# Patient Record
Sex: MALE
[De-identification: names, ages, dates, MRNs, and addresses within clinical notes are randomized per-mention and may not be internally consistent; named-entity substitution may affect disease eponyms.]

---

## 2024-02-27 PROBLEM — Z00.00 ENCOUNTER FOR PREVENTIVE HEALTH EXAMINATION: Status: ACTIVE | Noted: 2024-02-27

## 2024-03-15 ENCOUNTER — APPOINTMENT (OUTPATIENT)
Dept: SURGERY | Facility: CLINIC | Age: 54
End: 2024-03-15
Payer: COMMERCIAL

## 2024-03-15 VITALS
OXYGEN SATURATION: 99 % | DIASTOLIC BLOOD PRESSURE: 80 MMHG | HEART RATE: 80 BPM | TEMPERATURE: 97.6 F | HEIGHT: 68 IN | RESPIRATION RATE: 17 BRPM | WEIGHT: 175 LBS | BODY MASS INDEX: 26.52 KG/M2 | SYSTOLIC BLOOD PRESSURE: 119 MMHG

## 2024-03-15 DIAGNOSIS — Z83.3 FAMILY HISTORY OF DIABETES MELLITUS: ICD-10-CM

## 2024-03-15 DIAGNOSIS — N20.0 CALCULUS OF KIDNEY: ICD-10-CM

## 2024-03-15 PROCEDURE — 99203 OFFICE O/P NEW LOW 30 MIN: CPT

## 2024-03-15 NOTE — PHYSICAL EXAM
[Normal Heart Sounds] : normal heart sounds [Normal Breath Sounds] : Normal breath sounds [Alert] : alert [Oriented to Person] : oriented to person [Oriented to Place] : oriented to place [Calm] : calm [Oriented to Time] : oriented to time [Abdominal Masses] : No abdominal masses [Abdomen Tenderness] : ~T ~M No abdominal tenderness [No HSM] : no hepatosplenomegaly [de-identified] : WNL [de-identified] : WNL [de-identified] : DAVIDL [de-identified] : WNL [de-identified] : WNL ROM

## 2024-03-15 NOTE — ASSESSMENT
[FreeTextEntry1] : In summary the patient has had intermittent right lower quadrant pain for several months.  A CT abdomen pelvis demonstrates dilation and inflammation of the appendix suggestive of either chronic appendicitis versus appendiceal neoplasm.  I ordered a CEA level I recommended a colonoscopy.  Ultimately he will either require laparoscopic appendectomy versus a laparoscopic right colectomy if there is suggestion of an appendiceal neoplasm.  Final plan will be made following his colonoscopy.

## 2024-03-15 NOTE — HISTORY OF PRESENT ILLNESS
[de-identified] : Seb is a 52 y/o male here for a consultation visit, possible appendicitis.   Never has a colonoscopy.   CT A+P on 2/26/24 - Soft tissue masslike thickening at the base of a dilated retrocecal appendix. There us some nodularity along the lateral wall of the paaendic. There are periappendcial inflammatory changes. The findings are most suggestive of appendicitis. However, an underlying appendiceal neoplasm or mucocele must be strongly considered. A oerforating neoplasm is not excluded. Surgical consultation is advised. These findings were discussed with Dr. Fernandez at approximately.   Today pt reports pain of RLQ for 2-3 years - finds that controlling diet reduced pain. Denies nausea and vomiting. Denies fever and chills. Normal BM, denies constipation or diarrhea. Good appetite. Not taking anticoagulants.

## 2024-03-15 NOTE — CONSULT LETTER
[Dear  ___] : Dear  [unfilled], [Consult Letter:] : I had the pleasure of evaluating your patient, [unfilled]. [Please see my note below.] : Please see my note below. [Consult Closing:] : Thank you very much for allowing me to participate in the care of this patient.  If you have any questions, please do not hesitate to contact me. [Sincerely,] : Sincerely, [FreeTextEntry3] : Jesus Denson M.D., F.A.C.S, F.A.S.C.R.S

## 2024-03-20 DIAGNOSIS — Z12.11 ENCOUNTER FOR SCREENING FOR MALIGNANT NEOPLASM OF COLON: ICD-10-CM

## 2024-03-22 ENCOUNTER — OUTPATIENT (OUTPATIENT)
Dept: OUTPATIENT SERVICES | Facility: HOSPITAL | Age: 54
LOS: 1 days | End: 2024-03-22
Payer: COMMERCIAL

## 2024-03-22 VITALS
SYSTOLIC BLOOD PRESSURE: 116 MMHG | WEIGHT: 174.83 LBS | DIASTOLIC BLOOD PRESSURE: 84 MMHG | TEMPERATURE: 98 F | OXYGEN SATURATION: 98 % | HEIGHT: 68 IN | HEART RATE: 88 BPM | RESPIRATION RATE: 16 BRPM

## 2024-03-22 DIAGNOSIS — Z01.818 ENCOUNTER FOR OTHER PREPROCEDURAL EXAMINATION: ICD-10-CM

## 2024-03-22 DIAGNOSIS — Z12.11 ENCOUNTER FOR SCREENING FOR MALIGNANT NEOPLASM OF COLON: ICD-10-CM

## 2024-03-22 DIAGNOSIS — K36 OTHER APPENDICITIS: ICD-10-CM

## 2024-03-22 LAB
ALBUMIN SERPL ELPH-MCNC: 4.6 G/DL — SIGNIFICANT CHANGE UP (ref 3.3–5)
ALP SERPL-CCNC: 70 U/L — SIGNIFICANT CHANGE UP (ref 40–120)
ALT FLD-CCNC: 17 U/L — SIGNIFICANT CHANGE UP (ref 10–45)
ANION GAP SERPL CALC-SCNC: 14 MMOL/L — SIGNIFICANT CHANGE UP (ref 5–17)
ANISOCYTOSIS BLD QL: SLIGHT — SIGNIFICANT CHANGE UP
AST SERPL-CCNC: 18 U/L — SIGNIFICANT CHANGE UP (ref 10–40)
BASOPHILS # BLD AUTO: 0 K/UL — SIGNIFICANT CHANGE UP (ref 0–0.2)
BASOPHILS NFR BLD AUTO: 0 % — SIGNIFICANT CHANGE UP (ref 0–2)
BILIRUB SERPL-MCNC: 0.5 MG/DL — SIGNIFICANT CHANGE UP (ref 0.2–1.2)
BUN SERPL-MCNC: 20 MG/DL — SIGNIFICANT CHANGE UP (ref 7–23)
CALCIUM SERPL-MCNC: 10 MG/DL — SIGNIFICANT CHANGE UP (ref 8.4–10.5)
CEA SERPL-MCNC: 1.3 NG/ML — SIGNIFICANT CHANGE UP (ref 0–3.8)
CHLORIDE SERPL-SCNC: 101 MMOL/L — SIGNIFICANT CHANGE UP (ref 96–108)
CO2 SERPL-SCNC: 24 MMOL/L — SIGNIFICANT CHANGE UP (ref 22–31)
CREAT SERPL-MCNC: 1.04 MG/DL — SIGNIFICANT CHANGE UP (ref 0.5–1.3)
EGFR: 87 ML/MIN/1.73M2 — SIGNIFICANT CHANGE UP
ELLIPTOCYTES BLD QL SMEAR: SLIGHT — SIGNIFICANT CHANGE UP
EOSINOPHIL # BLD AUTO: 0.5 K/UL — SIGNIFICANT CHANGE UP (ref 0–0.5)
EOSINOPHIL NFR BLD AUTO: 7 % — HIGH (ref 0–6)
GIANT PLATELETS BLD QL SMEAR: PRESENT — SIGNIFICANT CHANGE UP
GLUCOSE SERPL-MCNC: 90 MG/DL — SIGNIFICANT CHANGE UP (ref 70–99)
HCT VFR BLD CALC: 40.2 % — SIGNIFICANT CHANGE UP (ref 39–50)
HGB BLD-MCNC: 12.5 G/DL — LOW (ref 13–17)
HYPOCHROMIA BLD QL: SLIGHT — SIGNIFICANT CHANGE UP
LYMPHOCYTES # BLD AUTO: 2.34 K/UL — SIGNIFICANT CHANGE UP (ref 1–3.3)
LYMPHOCYTES # BLD AUTO: 33 % — SIGNIFICANT CHANGE UP (ref 13–44)
MANUAL SMEAR VERIFICATION: SIGNIFICANT CHANGE UP
MCHC RBC-ENTMCNC: 19.4 PG — LOW (ref 27–34)
MCHC RBC-ENTMCNC: 31.1 GM/DL — LOW (ref 32–36)
MCV RBC AUTO: 62.5 FL — LOW (ref 80–100)
MONOCYTES # BLD AUTO: 0.74 K/UL — SIGNIFICANT CHANGE UP (ref 0–0.9)
MONOCYTES NFR BLD AUTO: 10.4 % — SIGNIFICANT CHANGE UP (ref 2–14)
NEUTROPHILS # BLD AUTO: 3.52 K/UL — SIGNIFICANT CHANGE UP (ref 1.8–7.4)
NEUTROPHILS NFR BLD AUTO: 49.6 % — SIGNIFICANT CHANGE UP (ref 43–77)
PLAT MORPH BLD: NORMAL — SIGNIFICANT CHANGE UP
PLATELET # BLD AUTO: 316 K/UL — SIGNIFICANT CHANGE UP (ref 150–400)
POIKILOCYTOSIS BLD QL AUTO: SLIGHT — SIGNIFICANT CHANGE UP
POTASSIUM SERPL-MCNC: 4.1 MMOL/L — SIGNIFICANT CHANGE UP (ref 3.5–5.3)
POTASSIUM SERPL-SCNC: 4.1 MMOL/L — SIGNIFICANT CHANGE UP (ref 3.5–5.3)
PROT SERPL-MCNC: 8.7 G/DL — HIGH (ref 6–8.3)
RBC # BLD: 6.43 M/UL — HIGH (ref 4.2–5.8)
RBC # FLD: 18.1 % — HIGH (ref 10.3–14.5)
RBC BLD AUTO: ABNORMAL
SODIUM SERPL-SCNC: 139 MMOL/L — SIGNIFICANT CHANGE UP (ref 135–145)
TARGETS BLD QL SMEAR: SLIGHT — SIGNIFICANT CHANGE UP
WBC # BLD: 7.09 K/UL — SIGNIFICANT CHANGE UP (ref 3.8–10.5)
WBC # FLD AUTO: 7.09 K/UL — SIGNIFICANT CHANGE UP (ref 3.8–10.5)

## 2024-03-22 PROCEDURE — G0463: CPT

## 2024-03-22 NOTE — H&P PST ADULT - ASSESSMENT
Activity: Gym once a week - cardio and light weight lifting, walks dogs every other day for 15 mts., can run short distance, can walk >5 blocks, >2 flights of stairs without stopping    DASI scale:    Mallampati:    Dentition: Denies loose teeth/dentures Activity: Gym once a week - cardio and light weight lifting, walks dogs every other day for 15 mts., can run short distance, can walk >5 blocks, >2 flights of stairs without stopping    DASI scale: 9.89    Mallampati: 2    Dentition: Denies loose teeth/dentures

## 2024-03-22 NOTE — H&P PST ADULT - NEGATIVE GASTROINTESTINAL SYMPTOMS
no nausea/no vomiting no nausea/no vomiting/no change in bowel habits/no flatulence/no melena/no hematochezia/no jaundice

## 2024-03-22 NOTE — H&P PST ADULT - NSANTHBPHIGHRD_ENT_A_CORE
You can access the FollowMyHealth Patient Portal offered by [facility] by registering at the following website: [email or web address]. By joining [employer]’s FollowMyHealth portal, you will also be able to view your health information using other applications (apps) compatible with our system. No

## 2024-03-22 NOTE — H&P PST ADULT - ENDOCRINE
patient brought in by ems from home after taking an extra dose of trazadone. Patient's daughter's report that she is more drowsy than normal negative

## 2024-03-22 NOTE — H&P PST ADULT - NSANTHOSAYNRD_GEN_A_CORE
No. BELKIS screening performed.  STOP BANG Legend: 0-2 = LOW Risk; 3-4 = INTERMEDIATE Risk; 5-8 = HIGH Risk

## 2024-03-22 NOTE — H&P PST ADULT - HISTORY OF PRESENT ILLNESS
52 yo male, PMH reports RLQ pain for several months, CT A/P reveales a dilation and inflammation of the appendix suggestive of either appendicitis vs appendiceal neoplasm   on and off for the past few years, after eating,        In summary the patient has had intermittent right lower quadrant pain for several months. A CT abdomen pelvis demonstrates dilation and inflammation of the appendix suggestive of either chronic appendicitis versus appendiceal neoplasm. I ordered a CEA level I recommended a colonoscopy. Ultimately he will either require laparoscopic appendectomy versus a laparoscopic right colectomy if there is suggestion of an appendiceal neoplasm. Final plan will be made following his colonoscopy.    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 Pt. presents to PST for cancer screening Colonoscopy on 3/25/24. Denies recent fever, chills, chest pain, SOB, changes in bowel/urinary habits or recent exposure to COVID-19 infection. Pt. denies clotting or bleeding disorders.

## 2024-03-22 NOTE — H&P PST ADULT - PROBLEM SELECTOR PLAN 1
Colonoscopy 3/25/24  Pre-op instructions provided - all questions answered  Pt. received pre-procedure diet and prep from GI office and understands instructions    Labs done at PST, including CEA as per GI Colonoscopy 3/25/24  Pre-op instructions provided - all questions answered  Pt. received pre-procedure diet and prep from GI office and understands instructions    CBC with diff, CMP and CEA done at PST as per GI

## 2024-03-22 NOTE — H&P PST ADULT - NSICDXFAMILYHX_GEN_ALL_CORE_FT
FAMILY HISTORY:  Father  Still living? No  Family history of abdominal aortic aneurysm (AAA), Age at diagnosis: Age Unknown

## 2024-03-25 ENCOUNTER — TRANSCRIPTION ENCOUNTER (OUTPATIENT)
Age: 54
End: 2024-03-25

## 2024-03-25 ENCOUNTER — OUTPATIENT (OUTPATIENT)
Dept: OUTPATIENT SERVICES | Facility: HOSPITAL | Age: 54
LOS: 1 days | End: 2024-03-25
Payer: COMMERCIAL

## 2024-03-25 ENCOUNTER — APPOINTMENT (OUTPATIENT)
Dept: SURGERY | Facility: HOSPITAL | Age: 54
End: 2024-03-25
Payer: COMMERCIAL

## 2024-03-25 ENCOUNTER — RESULT REVIEW (OUTPATIENT)
Age: 54
End: 2024-03-25

## 2024-03-25 VITALS
OXYGEN SATURATION: 100 % | HEART RATE: 72 BPM | RESPIRATION RATE: 16 BRPM | SYSTOLIC BLOOD PRESSURE: 116 MMHG | DIASTOLIC BLOOD PRESSURE: 73 MMHG

## 2024-03-25 VITALS
RESPIRATION RATE: 16 BRPM | WEIGHT: 175.05 LBS | OXYGEN SATURATION: 100 % | TEMPERATURE: 97 F | SYSTOLIC BLOOD PRESSURE: 121 MMHG | HEIGHT: 68 IN | HEART RATE: 87 BPM | DIASTOLIC BLOOD PRESSURE: 75 MMHG

## 2024-03-25 DIAGNOSIS — K36 OTHER APPENDICITIS: ICD-10-CM

## 2024-03-25 PROCEDURE — 45380 COLONOSCOPY AND BIOPSY: CPT

## 2024-03-25 PROCEDURE — 88305 TISSUE EXAM BY PATHOLOGIST: CPT | Mod: 26

## 2024-03-25 PROCEDURE — 45381 COLONOSCOPY SUBMUCOUS NJX: CPT

## 2024-03-25 PROCEDURE — 88305 TISSUE EXAM BY PATHOLOGIST: CPT

## 2024-03-25 DEVICE — NET RETRV ROT ROTH 2.5MMX230CM: Type: IMPLANTABLE DEVICE | Status: FUNCTIONAL

## 2024-03-25 RX ORDER — SODIUM CHLORIDE 9 MG/ML
500 INJECTION INTRAMUSCULAR; INTRAVENOUS; SUBCUTANEOUS
Refills: 0 | Status: COMPLETED | OUTPATIENT
Start: 2024-03-25 | End: 2024-03-25

## 2024-03-25 RX ADMIN — SODIUM CHLORIDE 30 MILLILITER(S): 9 INJECTION INTRAMUSCULAR; INTRAVENOUS; SUBCUTANEOUS at 15:32

## 2024-03-25 NOTE — ASU PATIENT PROFILE, ADULT - FALL HARM RISK - UNIVERSAL INTERVENTIONS
Bed in lowest position, wheels locked, appropriate side rails in place/Call bell, personal items and telephone in reach/Instruct patient to call for assistance before getting out of bed or chair/Non-slip footwear when patient is out of bed/[place] to call system/Physically safe environment - no spills, clutter or unnecessary equipment/Purposeful Proactive Rounding/Room/bathroom lighting operational, light cord in reach

## 2024-03-25 NOTE — ASU PATIENT PROFILE, ADULT - TEACHING/LEARNING LEARNING PREFERENCES
Sore throat since last Friday. On May 1 rx for Amoxicillin and Prednisone. Denies fever. Pain improved but returned. individual instruction/verbal instruction

## 2024-03-25 NOTE — ASU DISCHARGE PLAN (ADULT/PEDIATRIC) - CARE PROVIDER_API CALL
Jesus Denson  Colon/Rectal Surgery  [address]  [place] [address]  Phone: [phone]  Fax: [phone]  Follow Up Time: 1-3 days

## 2024-03-25 NOTE — ASU DISCHARGE PLAN (ADULT/PEDIATRIC) - NS MD DC FALL RISK RISK
For information on Fall & Injury Prevention, visit: [email or web address] OR  [email or web address] OR  https://www.cdc.gov/steadi/patient.html

## 2024-03-25 NOTE — PRE PROCEDURE NOTE - PRE PROCEDURE EVALUATION
Attending Physician:   Dr. Denson                         Procedure: Colonscopy     Indication for Procedure: Cancer Screening  ________________________________________________________  PAST MEDICAL & SURGICAL HISTORY:  Anemia, Fanconi      No significant past surgical history        ALLERGIES:  No Known Allergies    HOME MEDICATIONS:  Allegra 180 mg oral tablet: 1 tab(s) orally once a day as needed for  allergy symptoms  Multiple Vitamins oral tablet: 1 tab(s) orally once a day    AICD/PPM: [ ] yes   [ ] no    PERTINENT LAB DATA:                      PHYSICAL EXAMINATION:    Height (cm): 172.7  Weight (kg): 79.4  BMI (kg/m2): 26.6  BSA (m2): 1.93T(C): 36.1  HR: 87  BP: 121/75  RR: 16  SpO2: 100%    Constitutional: NAD  HEENT: PERRLA, EOMI,    Neck:  No JVD  Respiratory: CTAB/L  Cardiovascular: S1 and S2  Gastrointestinal: BS+, soft, NT/ND  Extremities: No peripheral edema  Neurological: A/O x 3, no focal deficits  Psychiatric: Normal mood, normal affect  Skin: No rashes    ASA Class: I [ ]  II [ ]  III [ ]  IV [ ]    COMMENTS:    The patient is a suitable candidate for the planned procedure unless box checked [ ]  No, explain:

## 2024-03-26 ENCOUNTER — TRANSCRIPTION ENCOUNTER (OUTPATIENT)
Age: 54
End: 2024-03-26

## 2024-03-26 ENCOUNTER — OUTPATIENT (OUTPATIENT)
Dept: OUTPATIENT SERVICES | Facility: HOSPITAL | Age: 54
LOS: 1 days | End: 2024-03-26
Payer: COMMERCIAL

## 2024-03-26 ENCOUNTER — APPOINTMENT (OUTPATIENT)
Dept: CT IMAGING | Facility: HOSPITAL | Age: 54
End: 2024-03-26
Payer: COMMERCIAL

## 2024-03-26 DIAGNOSIS — K38.8 OTHER SPECIFIED DISEASES OF APPENDIX: ICD-10-CM

## 2024-03-26 PROBLEM — D61.09: Chronic | Status: ACTIVE | Noted: 2024-03-22

## 2024-03-26 PROCEDURE — 71260 CT THORAX DX C+: CPT | Mod: 26

## 2024-03-26 PROCEDURE — 71260 CT THORAX DX C+: CPT

## 2024-03-29 ENCOUNTER — APPOINTMENT (OUTPATIENT)
Dept: SURGERY | Facility: CLINIC | Age: 54
End: 2024-03-29
Payer: COMMERCIAL

## 2024-03-29 VITALS
BODY MASS INDEX: 26.52 KG/M2 | WEIGHT: 175 LBS | RESPIRATION RATE: 18 BRPM | DIASTOLIC BLOOD PRESSURE: 84 MMHG | TEMPERATURE: 98.7 F | SYSTOLIC BLOOD PRESSURE: 126 MMHG | HEART RATE: 94 BPM | HEIGHT: 68 IN | OXYGEN SATURATION: 99 %

## 2024-03-29 LAB — SURGICAL PATHOLOGY STUDY: SIGNIFICANT CHANGE UP

## 2024-03-29 PROCEDURE — 99213 OFFICE O/P EST LOW 20 MIN: CPT

## 2024-03-29 NOTE — PHYSICAL EXAM
[Normal Rate and Rhythm] : normal rate and rhythm [de-identified] : nad [Abdominal Masses] : No abdominal masses

## 2024-03-29 NOTE — HISTORY OF PRESENT ILLNESS
[de-identified] : Seb is a 54 y/o male here for a follow up visit.   CT A+P on 2/26/24 - Soft tissue mass-like thickening at the base of a dilated retrocecal appendix. There us some nodularity along the lateral wall of the paaendic. There are periappendcial inflammatory changes. The findings are most suggestive of appendicitis. However, an underlying appendiceal neoplasm or mucocele must be strongly considered. A perforating neoplasm is not excluded. Surgical consultation is advised.   CEA 03/22/24 1.3   CT Chest on 3/26/24 - No acute pleural or parenchymal aorta. No thoracic lymphadenopathy.  Colonoscopy on 3/25/24 - Likely malignant tumor at the hepatic flexure. Biopsied. Tattooed. Tumor at the appendiceal orifice. Non-bleeding internal hemorrhoids. The examination was otherwise normal. Pathology: 1. Colon, hepatic flexure mass, biopsy: - Foci highly suspicious for adenocarcinoma, in a background of low- and high-grade dysplasia, with ulcer related changes   Last seen on 3/15/24 - In summary the patient has had intermittent right lower quadrant pain for several months. A CT abdomen pelvis demonstrates dilation and inflammation of the appendix suggestive of either chronic appendicitis versus appendiceal neoplasm. I ordered a CEA level I recommended a colonoscopy. Ultimately he will either require laparoscopic appendectomy versus a laparoscopic right colectomy if there is suggestion of an appendiceal neoplasm. Final plan will be made following his colonoscopy.  Today pt reports intermittent RLQ abdominal pain for more than 2 years, takes Tylenol PRN.  Regular BMs daily, no straining, no bleeding.  Good appetite.  No c/o nausea/vomiting.  Denies fever and chills.

## 2024-03-29 NOTE — ASSESSMENT
[FreeTextEntry1] : In summary the patient has a newly diagnosed colonoscopically unresectable likely malignant mass at the hepatic flexure and an abnormal appendix on CT.  Colonoscopic biopsy of the hepatic flexure mass is suspicious for adenocarcinoma in a background of high-grade dysplasia.  CT chest demonstrates no obvious metastatic disease and his CEA is normal.  I recommended he undergo laparoscopic right hemicolectomy encompassing both the abnormal appendix and the lesion at the hepatic flexure.  I explained the risks benefits and alternatives of surgery including the risks of bleeding infection anastomotic leakage and the possible need for an open procedure.  I explained the rare incidence of the need for a temporary stoma.  I explained that the need for any potential adjuvant treatment will be based on final pathology.

## 2024-04-09 ENCOUNTER — OUTPATIENT (OUTPATIENT)
Dept: OUTPATIENT SERVICES | Facility: HOSPITAL | Age: 54
LOS: 1 days | End: 2024-04-09
Payer: COMMERCIAL

## 2024-04-09 VITALS
HEIGHT: 68 IN | OXYGEN SATURATION: 99 % | WEIGHT: 175.05 LBS | SYSTOLIC BLOOD PRESSURE: 118 MMHG | TEMPERATURE: 98 F | RESPIRATION RATE: 14 BRPM | DIASTOLIC BLOOD PRESSURE: 80 MMHG | HEART RATE: 79 BPM

## 2024-04-09 DIAGNOSIS — K63.89 OTHER SPECIFIED DISEASES OF INTESTINE: ICD-10-CM

## 2024-04-09 DIAGNOSIS — Z01.818 ENCOUNTER FOR OTHER PREPROCEDURAL EXAMINATION: ICD-10-CM

## 2024-04-09 DIAGNOSIS — Z29.9 ENCOUNTER FOR PROPHYLACTIC MEASURES, UNSPECIFIED: ICD-10-CM

## 2024-04-09 DIAGNOSIS — Z98.890 OTHER SPECIFIED POSTPROCEDURAL STATES: Chronic | ICD-10-CM

## 2024-04-09 DIAGNOSIS — K38.8 OTHER SPECIFIED DISEASES OF APPENDIX: ICD-10-CM

## 2024-04-09 LAB
A1C WITH ESTIMATED AVERAGE GLUCOSE RESULT: 5.2 % — SIGNIFICANT CHANGE UP (ref 4–5.6)
ALBUMIN SERPL ELPH-MCNC: 4.2 G/DL — SIGNIFICANT CHANGE UP (ref 3.3–5)
ALP SERPL-CCNC: 71 U/L — SIGNIFICANT CHANGE UP (ref 40–120)
ALT FLD-CCNC: 25 U/L — SIGNIFICANT CHANGE UP (ref 10–45)
ANION GAP SERPL CALC-SCNC: 12 MMOL/L — SIGNIFICANT CHANGE UP (ref 5–17)
AST SERPL-CCNC: 24 U/L — SIGNIFICANT CHANGE UP (ref 10–40)
BILIRUB SERPL-MCNC: 0.5 MG/DL — SIGNIFICANT CHANGE UP (ref 0.2–1.2)
BLD GP AB SCN SERPL QL: NEGATIVE — SIGNIFICANT CHANGE UP
BUN SERPL-MCNC: 26 MG/DL — HIGH (ref 7–23)
CALCIUM SERPL-MCNC: 9 MG/DL — SIGNIFICANT CHANGE UP (ref 8.4–10.5)
CEA SERPL-MCNC: 1.3 NG/ML — SIGNIFICANT CHANGE UP (ref 0–3.8)
CHLORIDE SERPL-SCNC: 103 MMOL/L — SIGNIFICANT CHANGE UP (ref 96–108)
CO2 SERPL-SCNC: 25 MMOL/L — SIGNIFICANT CHANGE UP (ref 22–31)
CREAT SERPL-MCNC: 1.21 MG/DL — SIGNIFICANT CHANGE UP (ref 0.5–1.3)
EGFR: 72 ML/MIN/1.73M2 — SIGNIFICANT CHANGE UP
ESTIMATED AVERAGE GLUCOSE: 103 MG/DL — SIGNIFICANT CHANGE UP (ref 68–114)
GLUCOSE SERPL-MCNC: 95 MG/DL — SIGNIFICANT CHANGE UP (ref 70–99)
HCT VFR BLD CALC: 38 % — LOW (ref 39–50)
HGB BLD-MCNC: 11.5 G/DL — LOW (ref 13–17)
MCHC RBC-ENTMCNC: 19.2 PG — LOW (ref 27–34)
MCHC RBC-ENTMCNC: 30.3 GM/DL — LOW (ref 32–36)
MCV RBC AUTO: 63.3 FL — LOW (ref 80–100)
NRBC # BLD: 0 /100 WBCS — SIGNIFICANT CHANGE UP (ref 0–0)
PLATELET # BLD AUTO: 288 K/UL — SIGNIFICANT CHANGE UP (ref 150–400)
POTASSIUM SERPL-MCNC: 4.1 MMOL/L — SIGNIFICANT CHANGE UP (ref 3.5–5.3)
POTASSIUM SERPL-SCNC: 4.1 MMOL/L — SIGNIFICANT CHANGE UP (ref 3.5–5.3)
PROT SERPL-MCNC: 8.2 G/DL — SIGNIFICANT CHANGE UP (ref 6–8.3)
RBC # BLD: 6 M/UL — HIGH (ref 4.2–5.8)
RBC # FLD: 16.1 % — HIGH (ref 10.3–14.5)
RH IG SCN BLD-IMP: POSITIVE — SIGNIFICANT CHANGE UP
SODIUM SERPL-SCNC: 140 MMOL/L — SIGNIFICANT CHANGE UP (ref 135–145)
WBC # BLD: 6.42 K/UL — SIGNIFICANT CHANGE UP (ref 3.8–10.5)
WBC # FLD AUTO: 6.42 K/UL — SIGNIFICANT CHANGE UP (ref 3.8–10.5)

## 2024-04-09 PROCEDURE — 83036 HEMOGLOBIN GLYCOSYLATED A1C: CPT

## 2024-04-09 PROCEDURE — 86900 BLOOD TYPING SEROLOGIC ABO: CPT

## 2024-04-09 PROCEDURE — 82378 CARCINOEMBRYONIC ANTIGEN: CPT

## 2024-04-09 PROCEDURE — 80053 COMPREHEN METABOLIC PANEL: CPT

## 2024-04-09 PROCEDURE — 86901 BLOOD TYPING SEROLOGIC RH(D): CPT

## 2024-04-09 PROCEDURE — G0463: CPT

## 2024-04-09 PROCEDURE — 86850 RBC ANTIBODY SCREEN: CPT

## 2024-04-09 PROCEDURE — 36415 COLL VENOUS BLD VENIPUNCTURE: CPT

## 2024-04-09 PROCEDURE — 85027 COMPLETE CBC AUTOMATED: CPT

## 2024-04-09 RX ORDER — CHLORHEXIDINE GLUCONATE 213 G/1000ML
1 SOLUTION TOPICAL ONCE
Refills: 0 | Status: DISCONTINUED | OUTPATIENT
Start: 2024-04-18 | End: 2024-04-18

## 2024-04-09 RX ORDER — LIDOCAINE HCL 20 MG/ML
0.2 VIAL (ML) INJECTION ONCE
Refills: 0 | Status: DISCONTINUED | OUTPATIENT
Start: 2024-04-18 | End: 2024-04-18

## 2024-04-09 RX ORDER — CEFOTETAN DISODIUM 1 G
2 VIAL (EA) INJECTION ONCE
Refills: 0 | Status: DISCONTINUED | OUTPATIENT
Start: 2024-04-18 | End: 2024-04-20

## 2024-04-09 RX ORDER — SODIUM CHLORIDE 9 MG/ML
3 INJECTION INTRAMUSCULAR; INTRAVENOUS; SUBCUTANEOUS EVERY 8 HOURS
Refills: 0 | Status: DISCONTINUED | OUTPATIENT
Start: 2024-04-18 | End: 2024-04-18

## 2024-04-09 NOTE — H&P PST ADULT - PROBLEM SELECTOR PLAN 1
PT scheduled for Laparoscopic right colectomy with Dr. Jesus Denson on 4/18/2024  -Pre op instructions provided.  -Chlorhexidine wash and instructions provided.  -Incentive spirometer device and instructions provided.  -ERP provided.  LABS: CBC. CMP, T&S, HgbA1c, CEA done at [facility].

## 2024-04-09 NOTE — H&P PST ADULT - HISTORY OF PRESENT ILLNESS
52 y/o M, PMHx Fanconi anemia minor (without any S/S or low iron levels), had intermittent RLQ pain for several years (2-3 years), usually after eating.  PT had CT - revealed dilation and inflammation of the appendix suggestive of appendicitis or appendiceal neoplasm.  PT had recent colonoscopy (March 25, 2024) - Likely malignant tumor at the hepatic flexure, tumor at the appendiceal orifice.    Pt presents to PST for Laparoscopic right colectomy with Dr. Jesus Denson on 4/24/2024.   Pt denies recent fever, chills, N/V/D, SOB, CP, palpitations, dizziness, HA, urinary or BM issues.

## 2024-04-09 NOTE — H&P PST ADULT - ASSESSMENT
DASI score: 8.23  DASI activity: active, able to walk long distance, walk incline/stairs, shopping, carry groceries, self care without SOB, CP.  Loose teeth or denture: denies any loose teeth or dentures    CAPRINI VTE 2.0 SCORE [CLOT updated 2019]    AGE RELATED RISK FACTORS                                                       MOBILITY RELATED FACTORS  [X ] Age 41-60 years                                            (1 Point)                    [ ] Bed rest                                                        (1 Point)  [ ] Age: 61-74 years                                           (2 Points)                  [ ] Plaster cast                                                   (2 Points)  [ ] Age= 75 years                                              (3 Points)                    [ ] Bed bound for more than 72 hours                 (2 Points)    DISEASE RELATED RISK FACTORS                                               GENDER SPECIFIC FACTORS  [ ] Edema in the lower extremities                       (1 Point)              [ ] Pregnancy                                                     (1 Point)  [ ] Varicose veins                                               (1 Point)                     [ ] Post-partum < 6 weeks                                   (1 Point)             [ X] BMI > 25 Kg/m2                                            (1 Point)                     [ ] Hormonal therapy  or oral contraception          (1 Point)                 [ ] Sepsis (in the previous month)                        (1 Point)               [ ] History of pregnancy complications                 (1 point)  [ ] Pneumonia or serious lung disease                                               [ ] Unexplained or recurrent                     (1 Point)           (in the previous month)                               (1 Point)  [ ] Abnormal pulmonary function test                     (1 Point)                 SURGERY RELATED RISK FACTORS  [ ] Acute myocardial infarction                              (1 Point)               [ ]  Section                                             (1 Point)  [ ] Congestive heart failure (in the previous month)  (1 Point)      [ ] Minor surgery                                                  (1 Point)   [ ] Inflammatory bowel disease                             (1 Point)               [ ] Arthroscopic surgery                                        (2 Points)  [ ] Central venous access                                      (2 Points)                [X ] General surgery lasting more than 45 minutes (2 points)  [ ] Malignancy- Present or previous                   (2 Points)                [ ] Elective arthroplasty                                         (5 points)    [ ] Stroke (in the previous month)                          (5 Points)                                                                                                                                                           HEMATOLOGY RELATED FACTORS                                                 TRAUMA RELATED RISK FACTORS  [ ] Prior episodes of VTE                                     (3 Points)                [ ] Fracture of the hip, pelvis, or leg                       (5 Points)  [ ] Positive family history for VTE                         (3 Points)             [ ] Acute spinal cord injury (in the previous month)  (5 Points)  [ ] Prothrombin 72660 A                                     (3 Points)               [ ] Paralysis  (less than 1 month)                             (5 Points)  [ ] Factor V Leiden                                             (3 Points)                  [ ] Multiple Trauma within 1 month                        (5 Points)  [ ] Lupus anticoagulants                                     (3 Points)                                                           [ ] Anticardiolipin antibodies                               (3 Points)                                                       [ ] High homocysteine in the blood                      (3 Points)                                             [ ] Other congenital or acquired thrombophilia      (3 Points)                                                [ ] Heparin induced thrombocytopenia                  (3 Points)                                     Total Score [    5      ]

## 2024-04-17 ENCOUNTER — TRANSCRIPTION ENCOUNTER (OUTPATIENT)
Age: 54
End: 2024-04-17

## 2024-04-18 ENCOUNTER — APPOINTMENT (OUTPATIENT)
Dept: SURGERY | Facility: HOSPITAL | Age: 54
End: 2024-04-18
Payer: COMMERCIAL

## 2024-04-18 ENCOUNTER — RESULT REVIEW (OUTPATIENT)
Age: 54
End: 2024-04-18

## 2024-04-18 ENCOUNTER — INPATIENT (INPATIENT)
Facility: HOSPITAL | Age: 54
LOS: 1 days | Discharge: HOME | DRG: 395 | End: 2024-04-20
Attending: COLON & RECTAL SURGERY | Admitting: COLON & RECTAL SURGERY
Payer: COMMERCIAL

## 2024-04-18 VITALS
HEIGHT: 68 IN | HEART RATE: 79 BPM | WEIGHT: 175.05 LBS | RESPIRATION RATE: 14 BRPM | TEMPERATURE: 98 F | DIASTOLIC BLOOD PRESSURE: 80 MMHG | OXYGEN SATURATION: 99 % | SYSTOLIC BLOOD PRESSURE: 118 MMHG

## 2024-04-18 DIAGNOSIS — K63.89 OTHER SPECIFIED DISEASES OF INTESTINE: ICD-10-CM

## 2024-04-18 DIAGNOSIS — K38.8 OTHER SPECIFIED DISEASES OF APPENDIX: ICD-10-CM

## 2024-04-18 DIAGNOSIS — Z98.890 OTHER SPECIFIED POSTPROCEDURAL STATES: Chronic | ICD-10-CM

## 2024-04-18 LAB
ACANTHOCYTES BLD QL SMEAR: SLIGHT — SIGNIFICANT CHANGE UP
ANION GAP SERPL CALC-SCNC: 11 MMOL/L — SIGNIFICANT CHANGE UP (ref 5–17)
ANISOCYTOSIS BLD QL: SLIGHT — SIGNIFICANT CHANGE UP
BUN SERPL-MCNC: 12 MG/DL — SIGNIFICANT CHANGE UP (ref 7–23)
CALCIUM SERPL-MCNC: 8.4 MG/DL — SIGNIFICANT CHANGE UP (ref 8.4–10.5)
CHLORIDE SERPL-SCNC: 103 MMOL/L — SIGNIFICANT CHANGE UP (ref 96–108)
CO2 SERPL-SCNC: 24 MMOL/L — SIGNIFICANT CHANGE UP (ref 22–31)
CREAT SERPL-MCNC: 1.16 MG/DL — SIGNIFICANT CHANGE UP (ref 0.5–1.3)
DACRYOCYTES BLD QL SMEAR: SLIGHT — SIGNIFICANT CHANGE UP
EGFR: 76 ML/MIN/1.73M2 — SIGNIFICANT CHANGE UP
GLUCOSE BLDC GLUCOMTR-MCNC: 129 MG/DL — HIGH (ref 70–99)
GLUCOSE SERPL-MCNC: 130 MG/DL — HIGH (ref 70–99)
HCT VFR BLD CALC: 36.3 % — LOW (ref 39–50)
HGB BLD-MCNC: 11.3 G/DL — LOW (ref 13–17)
MACROCYTES BLD QL: SLIGHT — SIGNIFICANT CHANGE UP
MAGNESIUM SERPL-MCNC: 2.2 MG/DL — SIGNIFICANT CHANGE UP (ref 1.6–2.6)
MANUAL SMEAR VERIFICATION: SIGNIFICANT CHANGE UP
MCHC RBC-ENTMCNC: 19.7 PG — LOW (ref 27–34)
MCHC RBC-ENTMCNC: 31.1 GM/DL — LOW (ref 32–36)
MCV RBC AUTO: 63.2 FL — LOW (ref 80–100)
MICROCYTES BLD QL: SLIGHT — SIGNIFICANT CHANGE UP
NEUTS BAND # BLD: 0.9 % — SIGNIFICANT CHANGE UP (ref 0–8)
OVALOCYTES BLD QL SMEAR: SLIGHT — SIGNIFICANT CHANGE UP
PHOSPHATE SERPL-MCNC: 1.6 MG/DL — LOW (ref 2.5–4.5)
PLAT MORPH BLD: ABNORMAL
PLATELET # BLD AUTO: 291 K/UL — SIGNIFICANT CHANGE UP (ref 150–400)
POIKILOCYTOSIS BLD QL AUTO: SLIGHT — SIGNIFICANT CHANGE UP
POTASSIUM SERPL-MCNC: 4.5 MMOL/L — SIGNIFICANT CHANGE UP (ref 3.5–5.3)
POTASSIUM SERPL-SCNC: 4.5 MMOL/L — SIGNIFICANT CHANGE UP (ref 3.5–5.3)
RBC # BLD: 5.74 M/UL — SIGNIFICANT CHANGE UP (ref 4.2–5.8)
RBC # FLD: 16.2 % — HIGH (ref 10.3–14.5)
RBC BLD AUTO: ABNORMAL
SODIUM SERPL-SCNC: 138 MMOL/L — SIGNIFICANT CHANGE UP (ref 135–145)
TARGETS BLD QL SMEAR: SLIGHT — SIGNIFICANT CHANGE UP
WBC # BLD: 17.86 K/UL — HIGH (ref 3.8–10.5)
WBC # FLD AUTO: 17.86 K/UL — HIGH (ref 3.8–10.5)

## 2024-04-18 PROCEDURE — 88309 TISSUE EXAM BY PATHOLOGIST: CPT | Mod: 26

## 2024-04-18 PROCEDURE — 44140 PARTIAL REMOVAL OF COLON: CPT

## 2024-04-18 PROCEDURE — 88341 IMHCHEM/IMCYTCHM EA ADD ANTB: CPT | Mod: 26

## 2024-04-18 PROCEDURE — 88342 IMHCHEM/IMCYTCHM 1ST ANTB: CPT | Mod: 26

## 2024-04-18 DEVICE — VISTASEAL FIBRIN HUMAN 4ML: Type: IMPLANTABLE DEVICE | Status: FUNCTIONAL

## 2024-04-18 DEVICE — STAPLER COVIDIEN GIA 80-3.0MM PURPLE: Type: IMPLANTABLE DEVICE | Status: FUNCTIONAL

## 2024-04-18 DEVICE — STAPLER COVIDIEN GIA 80-3.0MM PURPLE RELOAD: Type: IMPLANTABLE DEVICE | Status: FUNCTIONAL

## 2024-04-18 RX ORDER — ONDANSETRON 8 MG/1
4 TABLET, FILM COATED ORAL ONCE
Refills: 0 | Status: DISCONTINUED | OUTPATIENT
Start: 2024-04-18 | End: 2024-04-18

## 2024-04-18 RX ORDER — IBUPROFEN 200 MG
600 TABLET ORAL EVERY 6 HOURS
Refills: 0 | Status: DISCONTINUED | OUTPATIENT
Start: 2024-04-18 | End: 2024-04-20

## 2024-04-18 RX ORDER — OXYCODONE HYDROCHLORIDE 5 MG/1
5 TABLET ORAL EVERY 4 HOURS
Refills: 0 | Status: DISCONTINUED | OUTPATIENT
Start: 2024-04-18 | End: 2024-04-20

## 2024-04-18 RX ORDER — NALBUPHINE HYDROCHLORIDE 10 MG/ML
2.5 INJECTION, SOLUTION INTRAMUSCULAR; INTRAVENOUS; SUBCUTANEOUS EVERY 6 HOURS
Refills: 0 | Status: DISCONTINUED | OUTPATIENT
Start: 2024-04-18 | End: 2024-04-19

## 2024-04-18 RX ORDER — HEPARIN SODIUM 5000 [USP'U]/ML
5000 INJECTION INTRAVENOUS; SUBCUTANEOUS EVERY 8 HOURS
Refills: 0 | Status: DISCONTINUED | OUTPATIENT
Start: 2024-04-18 | End: 2024-04-20

## 2024-04-18 RX ORDER — SODIUM CHLORIDE 9 MG/ML
1000 INJECTION INTRAMUSCULAR; INTRAVENOUS; SUBCUTANEOUS
Refills: 0 | Status: DISCONTINUED | OUTPATIENT
Start: 2024-04-18 | End: 2024-04-19

## 2024-04-18 RX ORDER — KETOROLAC TROMETHAMINE 30 MG/ML
15 SYRINGE (ML) INJECTION EVERY 4 HOURS
Refills: 0 | Status: DISCONTINUED | OUTPATIENT
Start: 2024-04-18 | End: 2024-04-19

## 2024-04-18 RX ORDER — MORPHINE SULFATE 50 MG/1
0.15 CAPSULE, EXTENDED RELEASE ORAL ONCE
Refills: 0 | Status: DISCONTINUED | OUTPATIENT
Start: 2024-04-18 | End: 2024-04-19

## 2024-04-18 RX ORDER — INFLUENZA VIRUS VACCINE 15; 15; 15; 15 UG/.5ML; UG/.5ML; UG/.5ML; UG/.5ML
0.5 SUSPENSION INTRAMUSCULAR ONCE
Refills: 0 | Status: DISCONTINUED | OUTPATIENT
Start: 2024-04-18 | End: 2024-04-20

## 2024-04-18 RX ORDER — NALOXONE HYDROCHLORIDE 4 MG/.1ML
0.1 SPRAY NASAL
Refills: 0 | Status: DISCONTINUED | OUTPATIENT
Start: 2024-04-18 | End: 2024-04-19

## 2024-04-18 RX ORDER — FENTANYL CITRATE 50 UG/ML
25 INJECTION INTRAVENOUS
Refills: 0 | Status: DISCONTINUED | OUTPATIENT
Start: 2024-04-18 | End: 2024-04-18

## 2024-04-18 RX ORDER — ACETAMINOPHEN 500 MG
1000 TABLET ORAL EVERY 6 HOURS
Refills: 0 | Status: COMPLETED | OUTPATIENT
Start: 2024-04-18 | End: 2024-04-19

## 2024-04-18 RX ORDER — ONDANSETRON 8 MG/1
4 TABLET, FILM COATED ORAL EVERY 6 HOURS
Refills: 0 | Status: DISCONTINUED | OUTPATIENT
Start: 2024-04-18 | End: 2024-04-19

## 2024-04-18 RX ORDER — CELECOXIB 200 MG/1
400 CAPSULE ORAL ONCE
Refills: 0 | Status: COMPLETED | OUTPATIENT
Start: 2024-04-18 | End: 2024-04-18

## 2024-04-18 RX ORDER — OXYCODONE HYDROCHLORIDE 5 MG/1
2.5 TABLET ORAL EVERY 4 HOURS
Refills: 0 | Status: DISCONTINUED | OUTPATIENT
Start: 2024-04-18 | End: 2024-04-20

## 2024-04-18 RX ORDER — ACETAMINOPHEN 500 MG
1000 TABLET ORAL EVERY 6 HOURS
Refills: 0 | Status: COMPLETED | OUTPATIENT
Start: 2024-04-18 | End: 2025-03-17

## 2024-04-18 RX ORDER — BUTORPHANOL TARTRATE 2 MG/ML
0.12 INJECTION, SOLUTION INTRAMUSCULAR; INTRAVENOUS EVERY 6 HOURS
Refills: 0 | Status: DISCONTINUED | OUTPATIENT
Start: 2024-04-18 | End: 2024-04-19

## 2024-04-18 RX ADMIN — Medication 15 MILLIGRAM(S): at 23:32

## 2024-04-18 RX ADMIN — Medication 15 MILLIGRAM(S): at 23:02

## 2024-04-18 RX ADMIN — Medication 63.75 MILLIMOLE(S): at 23:01

## 2024-04-18 RX ADMIN — HEPARIN SODIUM 5000 UNIT(S): 5000 INJECTION INTRAVENOUS; SUBCUTANEOUS at 21:29

## 2024-04-18 RX ADMIN — Medication 400 MILLIGRAM(S): at 21:29

## 2024-04-18 RX ADMIN — CELECOXIB 400 MILLIGRAM(S): 200 CAPSULE ORAL at 11:55

## 2024-04-18 RX ADMIN — SODIUM CHLORIDE 40 MILLILITER(S): 9 INJECTION INTRAMUSCULAR; INTRAVENOUS; SUBCUTANEOUS at 21:28

## 2024-04-18 RX ADMIN — Medication 1000 MILLIGRAM(S): at 21:59

## 2024-04-18 RX ADMIN — SODIUM CHLORIDE 40 MILLILITER(S): 9 INJECTION INTRAMUSCULAR; INTRAVENOUS; SUBCUTANEOUS at 17:11

## 2024-04-18 NOTE — PATIENT PROFILE ADULT - NS PRO AD PATIENT TYPE
[FreeTextEntry1] : 48 yo female with history of b/l lower extremity varicose veins presents for evaluation\par \par venous duplex shows no evidence of dvt/svt, insufficiency noted in varicose veins of the left lower extremity and right gsv from the distal thigh to the calf \par at this time would recommend compression stockings and elevation \par pt to follow up in 3 months 
Health Care Proxy (HCP)

## 2024-04-18 NOTE — BRIEF OPERATIVE NOTE - TYPE OF ANESTHESIA
20 year old male with PMHx significant for ?G6PD deficiency hemolytic anemia s/p splenectomy, ascending cholangitis/CBD stones, s/p ERCP with CBD stent placement at OSH who was transferred to [facility] after developing SOB desatting to 80s and for concern of recent stent failure    1) HCAP - CTA chest on admission showed possible multifocal PNA, pt initially on Vanc/Zosyn, switched to Meropenem. Blood cx NGTD, CT chest 7/25 shows improving PNA. Appreciate ID eval. C/w Meropenem through 8/2    2) Cholangitis - s/p ERCP w/ CBD stent placement, c/w meropenem through 8/2    3) Abdominal pain - mild, not requiring pain meds, tolerating PO diet. Apprec GI f/u, possibly post-ERCP pancreatitis, or from recent cholangitis - CBD stent is patent, and Tbili is improving, c/w IVF hydration. Per GI, eventual stent removal and stone removal/sphincterotomy - repeat ERCP in 4 weeks, as outpatient. Tbili has been slightly uptrending last few days, now stable. Direct bili is 0.5, so hyperbilirubinemia predominantly 2/2 indirect bilirubin, likely from hemolytic anemia     4) Hemolytic anemia - unclear etiology, H/H stable at this time    5) DVT ppx - Lovenox, encourage ambulation       WBC slightly uptrending, clinically stable, d/w ID, continue to monitor on current abx General

## 2024-04-18 NOTE — BRIEF OPERATIVE NOTE - OPERATION/FINDINGS
s/p Laparoscopic converted to open right colectomy with side to side stapled ileocolic anastomoses. hepatic flexure mass seen with ink invading into right retroperitoneum

## 2024-04-18 NOTE — PATIENT PROFILE ADULT - FALL HARM RISK - UNIVERSAL INTERVENTIONS
[No] : not Bed in lowest position, wheels locked, appropriate side rails in place/Call bell, personal items and telephone in reach/Instruct patient to call for assistance before getting out of bed or chair/Non-slip footwear when patient is out of bed/[place] to call system/Physically safe environment - no spills, clutter or unnecessary equipment/Purposeful Proactive Rounding/Room/bathroom lighting operational, light cord in reach [NSR] : normal sinus rhythm [ICD] : Implantable cardioverter-defibrillator [[employer]] : [facility] [Normal] : The battery status is normal. [de-identified] : [id number] [de-identified] : 6/8/2015 [de-identified] : 52063 [de-identified] : NO treated or untreated episodes. \par programmed in primary vector [de-identified] : 30%

## 2024-04-18 NOTE — CHART NOTE - NSCHARTNOTEFT_GEN_A_CORE
Surgery Post-Op Note    Procedure: Right hemicolectomy    PACU labs/imaging for follow-up: CBC (H/H stable), BMP (phos low > repleted)    Subjective:   Pt seen and examined at the bedside. Pt w/ no complaints. Denies F/C/N/V. Pain controlled with medication.     Vital Signs Last 24 Hrs  T(C): 37.1 (18 Apr 2024 21:34), Max: 37.1 (18 Apr 2024 21:34)  T(F): 98.7 (18 Apr 2024 21:34), Max: 98.7 (18 Apr 2024 21:34)  HR: 92 (18 Apr 2024 21:34) (78 - 98)  BP: 115/65 (18 Apr 2024 21:34) (90/58 - 128/82)  BP(mean): 76 (18 Apr 2024 19:00) (66 - 78)  RR: 18 (18 Apr 2024 21:34) (14 - 18)  SpO2: 100% (18 Apr 2024 21:34) (98% - 100%)    Parameters below as of 18 Apr 2024 21:34  Patient On (Oxygen Delivery Method): nasal cannula  O2 Flow (L/min): 2      Physical Exam:  General: NAD, resting comfortably in bed  Neuro: A/O x 3, no focal deficits  Pulmonary: airway intact, nonlabored breathing  Cardiovascular: NSR  Abdominal: soft, non-tender, non-distended, incisions c/d/i  Extremities: WWP          LABS:                        11.3   17.86 )-----------( 291      ( 18 Apr 2024 16:30 )             36.3     04-18    138  |  103  |  12  ----------------------------<  130<H>  4.5   |  24  |  1.16    Ca    8.4      18 Apr 2024 16:30  Phos  1.6     04-18  Mg     2.2     04-18        CAPILLARY BLOOD GLUCOSE      POCT Blood Glucose.: 129 mg/dL (18 Apr 2024 10:59)    Urinalysis Basic - ( 18 Apr 2024 16:30 )    Color: x / Appearance: x / SG: x / pH: x  Gluc: 130 mg/dL / Ketone: x  / Bili: x / Urobili: x   Blood: x / Protein: x / Nitrite: x   Leuk Esterase: x / RBC: x / WBC x   Sq Epi: x / Non Sq Epi: x / Bacteria: x        ABO Interpretation: O (04-18 @ 11:42)        Radiology and Additional Studies:    Assessment:53y Male s/p above procedure    Plan:  IVF, Diet: CLD advance as tolerated  IV/PO pain meds  Sotomayor  Nausea control PRN  Incentive spirometer/OOB/Ambulate      Green Surgery       Jimmy Hudson PGY1

## 2024-04-18 NOTE — PATIENT PROFILE ADULT - FALL HARM RISK - PATIENT NEEDS ASSISTANCE
Last seen: 9/11/20  RTC: 1 month  Cancel: None  No-show: None  Next appt: None     Medication requested: escitalopram (LEXAPRO) 10 MG tablet  Directions: Take 1 tablet (10 mg) by mouth daily - Oral  Qty: 30  Last rx written: 9/11    Per 9/11 note:    CONTINUE escitalopram 10 mg tab daily    CONTINUE propranolol 10 mg up to three times daily as needed for anxiety- take more regularly.     Continue all other medications as reviewed per electronic medical record today.      Medication refill approved per refill protocol. 30-day zakia refill provided. Pt due for an appointment with Dr. Brownlee.   No assistance needed

## 2024-04-19 ENCOUNTER — TRANSCRIPTION ENCOUNTER (OUTPATIENT)
Age: 54
End: 2024-04-19

## 2024-04-19 LAB
ANION GAP SERPL CALC-SCNC: 9 MMOL/L — SIGNIFICANT CHANGE UP (ref 5–17)
BUN SERPL-MCNC: 17 MG/DL — SIGNIFICANT CHANGE UP (ref 7–23)
CALCIUM SERPL-MCNC: 8.2 MG/DL — LOW (ref 8.4–10.5)
CHLORIDE SERPL-SCNC: 103 MMOL/L — SIGNIFICANT CHANGE UP (ref 96–108)
CO2 SERPL-SCNC: 23 MMOL/L — SIGNIFICANT CHANGE UP (ref 22–31)
CREAT SERPL-MCNC: 1.13 MG/DL — SIGNIFICANT CHANGE UP (ref 0.5–1.3)
EGFR: 79 ML/MIN/1.73M2 — SIGNIFICANT CHANGE UP
GLUCOSE SERPL-MCNC: 115 MG/DL — HIGH (ref 70–99)
HCT VFR BLD CALC: 32.4 % — LOW (ref 39–50)
HGB BLD-MCNC: 10.2 G/DL — LOW (ref 13–17)
MAGNESIUM SERPL-MCNC: 2.1 MG/DL — SIGNIFICANT CHANGE UP (ref 1.6–2.6)
MCHC RBC-ENTMCNC: 19.8 PG — LOW (ref 27–34)
MCHC RBC-ENTMCNC: 31.5 GM/DL — LOW (ref 32–36)
MCV RBC AUTO: 62.8 FL — LOW (ref 80–100)
NRBC # BLD: 0 /100 WBCS — SIGNIFICANT CHANGE UP (ref 0–0)
PHOSPHATE SERPL-MCNC: 4.4 MG/DL — SIGNIFICANT CHANGE UP (ref 2.5–4.5)
PLATELET # BLD AUTO: 273 K/UL — SIGNIFICANT CHANGE UP (ref 150–400)
POTASSIUM SERPL-MCNC: 4.6 MMOL/L — SIGNIFICANT CHANGE UP (ref 3.5–5.3)
POTASSIUM SERPL-SCNC: 4.6 MMOL/L — SIGNIFICANT CHANGE UP (ref 3.5–5.3)
RBC # BLD: 5.16 M/UL — SIGNIFICANT CHANGE UP (ref 4.2–5.8)
RBC # FLD: 15.9 % — HIGH (ref 10.3–14.5)
SODIUM SERPL-SCNC: 135 MMOL/L — SIGNIFICANT CHANGE UP (ref 135–145)
WBC # BLD: 8.61 K/UL — SIGNIFICANT CHANGE UP (ref 3.8–10.5)
WBC # FLD AUTO: 8.61 K/UL — SIGNIFICANT CHANGE UP (ref 3.8–10.5)

## 2024-04-19 RX ADMIN — Medication 15 MILLIGRAM(S): at 12:31

## 2024-04-19 RX ADMIN — Medication 400 MILLIGRAM(S): at 16:09

## 2024-04-19 RX ADMIN — Medication 400 MILLIGRAM(S): at 04:01

## 2024-04-19 RX ADMIN — Medication 15 MILLIGRAM(S): at 06:26

## 2024-04-19 RX ADMIN — Medication 15 MILLIGRAM(S): at 17:25

## 2024-04-19 RX ADMIN — Medication 1000 MILLIGRAM(S): at 04:31

## 2024-04-19 RX ADMIN — Medication 15 MILLIGRAM(S): at 05:59

## 2024-04-19 RX ADMIN — HEPARIN SODIUM 5000 UNIT(S): 5000 INJECTION INTRAVENOUS; SUBCUTANEOUS at 12:31

## 2024-04-19 RX ADMIN — Medication 400 MILLIGRAM(S): at 09:53

## 2024-04-19 RX ADMIN — HEPARIN SODIUM 5000 UNIT(S): 5000 INJECTION INTRAVENOUS; SUBCUTANEOUS at 05:59

## 2024-04-19 RX ADMIN — HEPARIN SODIUM 5000 UNIT(S): 5000 INJECTION INTRAVENOUS; SUBCUTANEOUS at 21:08

## 2024-04-19 NOTE — DIETITIAN INITIAL EVALUATION ADULT - PERTINENT LABORATORY DATA
04-19    135  |  103  |  17  ----------------------------<  115<H>  4.6   |  23  |  1.13    Ca    8.2<L>      19 Apr 2024 06:50  Phos  4.4     04-19  Mg     2.1     04-19    POCT Blood Glucose.: 129 mg/dL (04-18-24 @ 10:59)  A1C with Estimated Average Glucose Result: 5.2 % (04-09-24 @ 09:08)

## 2024-04-19 NOTE — DISCHARGE NOTE PROVIDER - NSDCMRMEDTOKEN_GEN_ALL_CORE_FT
Allegra 180 mg oral tablet: 1 tab(s) orally once a day as needed for  allergy symptoms  Multiple Vitamins oral tablet: 1 tab(s) orally once a day

## 2024-04-19 NOTE — DIETITIAN INITIAL EVALUATION ADULT - NSFNSGIIOFT_GEN_A_CORE
Denies nausea, vomiting, constipation, diarrhea. Reports no BM yet. Pt not currently ordered for bowel regimen.

## 2024-04-19 NOTE — DIETITIAN INITIAL EVALUATION ADULT - OTHER INFO
Reports  lbs. Denies recent wt changes.   Dosing wt: 175 lbs (04-18)  Wt history per chart: 175 lbs (04-09), 174.9 lbs (03-22). RD to continue to monitor weight trends as able/available.     Additional nutrition-related concerns:  - s/p R hemicolectomy

## 2024-04-19 NOTE — DIETITIAN INITIAL EVALUATION ADULT - RD TO REMAIN AVAILABLE
Discontinue Regimen: All other acne rosacea medications
Otc Regimen: Gentle cleanser and moisturizer, sunscreen
Plan: Will send Rx for Myorisan 10mg to [facility] once labs are received and reviewed.
Detail Level: Zone
Hyun Watson, KIRANN, CDN (Available via [facility] TEAMS)/yes
Plan: Take 2 po at first sign of flare, repeat in 12 hours PRN
Initiate Treatment: Valtrex 1gm

## 2024-04-19 NOTE — PROGRESS NOTE ADULT - ASSESSMENT
53y Male s/p right colectomy en bloc abdominal wall 4/18.     Plan:  ERP  IVF, Diet: CLD advance as tolerated  IV/PO pain meds  Sotomayor > dc in am   Nausea control PRN  DVT PPX  Incentive spirometer/OOB/Ambulate      Green Surgery  53y Male s/p right colectomy en bloc abdominal wall 4/18.     Plan:  ERP  IVF, Diet: CLD advance once tolerating full tray  IV/PO pain meds  Sotomayor > dc at 10AM  Nausea control PRN  DVT PPX  Incentive spirometer/OOB/Ambulate      Green Surgery

## 2024-04-19 NOTE — DIETITIAN INITIAL EVALUATION ADULT - NS FNS DIET ORDER
Diet, Low Fiber:   Ensure Surgery Cans or Servings Per Day:  1     Special Instructions for Nursing:  Advance diet to low fiber with 1 ensure surgery if patient tolerates 1 clear liquid tray (04-19-24 @ 09:45)

## 2024-04-19 NOTE — DISCHARGE NOTE PROVIDER - HOSPITAL COURSE
HPI: 52 y/o M, PMHx Fanconi anemia minor (without any S/S or low iron levels), had intermittent RLQ pain for several years (2-3 years), usually after eating.  PT had CT - revealed dilation and inflammation of the appendix suggestive of appendicitis or appendiceal neoplasm. PT had recent colonoscopy (March 25, 2024) - Likely malignant tumor at the hepatic flexure, tumor at the appendiceal orifice. Pt presented to [facility] for Laparoscopic right colectomy with Dr. Jesus Denson on 4/24/2024.    Pt was admitted under Surgery for further evaluation and management. Pt was taken to the OR on 4/18/24, and is s/p laparoscopic converted to open right colectomy with side to side stapled ileocolic anastomoses. The patient tolerated the procedure well (see operative report for full details). Pt was transferred to the PACU in stable condition. In the PACU, the patient's pain was controlled and vitals stable. The patient was given clear liquids with Ensure Clears in PACU, and encouraged with early ambulation. On POC, the patient was doing well. The patient was transferred to the surgical floor in stable condition. ERP protocol was followed.     On POD #1, pt was stable and doing well. Pt's Sotomayor was discontinued on 4/19, and passed TOV. Once IV pain control dosing complete, pt was transitioned to oral Tylenol with Oxycodone for breakthrough pain. Diet was advanced as tolerated, and GI function returned.     Caprini score is 6 ; ******?????    On the day of discharge, the patient's vitals are stable, pain is controlled, voiding urine, passing gas/stool, tolerating a low fiber diet, and ambulating well. Pt will f/u with Dr. Denson  in 1-2 weeks. Pt will f/u with PCP in 1-2 weeks. HPI: 52 y/o M, PMHx Fanconi anemia minor (without any S/S or low iron levels), had intermittent RLQ pain for several years (2-3 years), usually after eating.  PT had CT - revealed dilation and inflammation of the appendix suggestive of appendicitis or appendiceal neoplasm. PT had recent colonoscopy (March 25, 2024) - Likely malignant tumor at the hepatic flexure, tumor at the appendiceal orifice. Pt presented to [facility] for Laparoscopic right colectomy with Dr. Jesus Denson on 4/24/2024.    Pt was admitted under Surgery for further evaluation and management. Pt was taken to the OR on 4/18/24, and is s/p laparoscopic converted to open right colectomy with side to side stapled ileocolic anastomoses. The patient tolerated the procedure well (see operative report for full details). Pt was transferred to the PACU in stable condition. In the PACU, the patient's pain was controlled and vitals stable. The patient was given clear liquids with Ensure Clears in PACU, and encouraged with early ambulation. On POC, the patient was doing well. The patient was transferred to the surgical floor in stable condition. ERP protocol was followed.     On POD #1, pt was stable and doing well. Pt's Sotomayor was discontinued on 4/19, and passed TOV. Once IV pain control dosing complete, pt was transitioned to oral Tylenol with Oxycodone for breakthrough pain. Diet was advanced as tolerated, and GI function returned.     Caprini score is 5    On the day of discharge, the patient's vitals are stable, pain is controlled, voiding urine, passing gas/stool, tolerating a low fiber diet, and ambulating well. Pt will f/u with Dr. Denson  in 1-2 weeks. Pt will f/u with PCP in 1-2 weeks.

## 2024-04-19 NOTE — DISCHARGE NOTE PROVIDER - CARE PROVIDER_API CALL
Jesus Denson  Colon/Rectal Surgery  [address], [facility] [address]  [place] [address]  Phone: [phone]  Fax: [phone]  Follow Up Time: 2 weeks

## 2024-04-19 NOTE — PROGRESS NOTE ADULT - SUBJECTIVE AND OBJECTIVE BOX
Day 1 of Anesthesia Pain Management Service    SUBJECTIVE: Doing ok  Pain Scale Score:          [X] Refer to charted pain scores    THERAPY:    s/p    150 mcg PF morphine on 4\18\2024      MEDICATIONS  (STANDING):  acetaminophen     Tablet .. 1000 milliGRAM(s) Oral every 6 hours  acetaminophen   IVPB .. 1000 milliGRAM(s) IV Intermittent every 6 hours  cefoTEtan  IVPB 2 Gram(s) IV Intermittent once  heparin   Injectable 5000 Unit(s) SubCutaneous every 8 hours  ibuprofen  Tablet. 600 milliGRAM(s) Oral every 6 hours  influenza   Vaccine 0.5 milliLiter(s) IntraMuscular once  ketorolac   Injectable 15 milliGRAM(s) IV Push every 4 hours  morphine PF Spinal 0.15 milliGRAM(s) IntraThecal. once  sodium chloride 0.9%. 1000 milliLiter(s) (40 mL/Hr) IV Continuous <Continuous>    MEDICATIONS  (PRN):  butorphanol Injectable 0.125 milliGRAM(s) IV Push every 6 hours PRN Pruritus  nalbuphine Injectable 2.5 milliGRAM(s) IV Push every 6 hours PRN Pruritus  naloxone Injectable 0.1 milliGRAM(s) IV Push every 3 minutes PRN For ANY of the following changes in patient status:  A. RR LESS THAN 10 breaths per minute, B. Oxygen saturation LESS THAN 90%, C. Sedation score of 6  ondansetron Injectable 4 milliGRAM(s) IV Push every 6 hours PRN Nausea  oxyCODONE    IR 5 milliGRAM(s) Oral every 4 hours PRN Severe Pain (7 - 10)  oxyCODONE    IR 2.5 milliGRAM(s) Oral every 4 hours PRN Moderate Pain (4 - 6)      OBJECTIVE:    Sedation:        	[X] Alert	 [ ] Drowsy	[ ] Arousable      [ ] Asleep       [ ] Unresponsive    Side Effects:	[ ] None 	[ ] Nausea	[ ] Vomiting         [X ] Pruritus  		[ ] Weakness            [ ] Numbness	          [ ] Other:    Vital Signs Last 24 Hrs  T(C): 36.8 (19 Apr 2024 08:40), Max: 37.1 (18 Apr 2024 21:34)  T(F): 98.3 (19 Apr 2024 08:40), Max: 98.7 (18 Apr 2024 21:34)  HR: 79 (19 Apr 2024 08:40) (76 - 98)  BP: 104/63 (19 Apr 2024 08:40) (90/58 - 128/82)  BP(mean): 76 (18 Apr 2024 19:00) (66 - 78)  RR: 18 (19 Apr 2024 08:40) (14 - 18)  SpO2: 99% (19 Apr 2024 08:40) (96% - 100%)    Parameters below as of 19 Apr 2024 08:40  Patient On (Oxygen Delivery Method): room air        ASSESSMENT/ PLAN  [X] Patient to be transitioned to prn analgesics after 24 hours  [X] Pain management per primary service, pain service to sign off   [X]Documentation and Verification of current medications

## 2024-04-19 NOTE — DISCHARGE NOTE PROVIDER - NSDCCPCAREPLAN_GEN_ALL_CORE_FT
PRINCIPAL DISCHARGE DIAGNOSIS  Diagnosis: S/P right colectomy  Assessment and Plan of Treatment: Status post lap converted to open with right colectomy  WOUND CARE: Remove outer dressing prior to shower.  You may shower. Do not scrub incision. Pat Dry abdomen. Leave the white steri strips in place, they will fall off on their own in approximately 5-7 days. Staples will be removed at follow up office visit.  Cover incisions with dry gauze and paper tape, change daily or as needed.   BATHING: You may shower and/or sponge bathe 24 hours after surgery. Do no submerge the incision underwater for the next 2 weeks.  ACTIVITY: No heavy lifting anything more than 10-15lbs or straining. Otherwise, you may return to your usual level of physical activity. If you are taking narcotic pain medication (such as Oxycodone) do NOT drive a car, operate machinery or make important decisions.  DIET: Maintain Low Fiber Diet until your next appointment.  PAIN: A prescription for oxycodone has been sent to the pharmacy. You should only take these for severe pain. For mild or moderate pain, you may take 975mg of tylenol every 6 hours. Do not exceed more than 4G tylenol per day.   NOTIFY YOUR SURGEON IF: You have any bleeding that does not stop, any pus draining from your wound, any fever (over 100.4 F) or chills, persistent nausea/vomiting with inability to tolerate food or liquids, persistent diarrhea, or if your pain is not controlled on your discharge pain medications.  FOLLOW-UP:  1. Please call to make a follow-up appointment within one to two weeks of discharge with Dr. Denson.  2. Please follow up with your primary care physician in one week regarding your hospitalization.

## 2024-04-19 NOTE — DIETITIAN INITIAL EVALUATION ADULT - ADD RECOMMEND
1) Continue Low Fiber diet with Ensure Surgery 1x/day.  2) Monitor PO intake, GI tolerance, skin integrity, labs, weight, and bowel movement regularity.   3) Honor food preferences as feasible. Assist with meals PRN and encourage PO intake.  4) RD remains available upon request and will follow-up per protocol.

## 2024-04-19 NOTE — DIETITIAN INITIAL EVALUATION ADULT - PERSON TAUGHT/METHOD
Provided low-fiber nutrition therapy including importance of avoiding  fiber rich foods, fresh fruits/vegetables, whole grains, and added fiber in processed foods. Discussed chewing foods well and adequate hydration and protein intake. Discussed gradual reintroduction of fiber back into diet once cleared by MD. Pt verbalized understanding and accepted written handout. Patient with no nutrition-related questions at this time. Made aware RD remains available as needed./verbal instruction/written material/teach back - (Patient repeats in own words)/patient instructed

## 2024-04-19 NOTE — DIETITIAN INITIAL EVALUATION ADULT - REASON
Nutrition-focused physical examination deferred at this time - pt with stable wt hx, reporting good PO intake PTA. No overt signs of fat/muscle wasting visually observed.

## 2024-04-19 NOTE — DIETITIAN INITIAL EVALUATION ADULT - REASON FOR ADMISSION
Other specified disorders of intestines    Other diseases of appendix    Per chart, pt is a 54 y/o M, PMHx Fanconi anemia minor (without any S/S or low iron levels), had intermittent RLQ pain for several years (2-3 years), usually after eating.  PT had CT - revealed dilation and inflammation of the appendix suggestive of appendicitis or appendiceal neoplasm.  PT had recent colonoscopy (March 25, 2024) - Likely malignant tumor at the hepatic flexure, tumor at the appendiceal orifice.  S/p right colectomy en bloc abdominal wall 4/18.

## 2024-04-19 NOTE — DIETITIAN INITIAL EVALUATION ADULT - PERTINENT MEDS FT
MEDICATIONS  (STANDING):  acetaminophen     Tablet .. 1000 milliGRAM(s) Oral every 6 hours  acetaminophen   IVPB .. 1000 milliGRAM(s) IV Intermittent every 6 hours  cefoTEtan  IVPB 2 Gram(s) IV Intermittent once  heparin   Injectable 5000 Unit(s) SubCutaneous every 8 hours  ibuprofen  Tablet. 600 milliGRAM(s) Oral every 6 hours  influenza   Vaccine 0.5 milliLiter(s) IntraMuscular once  ketorolac   Injectable 15 milliGRAM(s) IV Push every 4 hours  morphine PF Spinal 0.15 milliGRAM(s) IntraThecal. once  sodium chloride 0.9%. 1000 milliLiter(s) (40 mL/Hr) IV Continuous <Continuous>    MEDICATIONS  (PRN):  butorphanol Injectable 0.125 milliGRAM(s) IV Push every 6 hours PRN Pruritus  nalbuphine Injectable 2.5 milliGRAM(s) IV Push every 6 hours PRN Pruritus  naloxone Injectable 0.1 milliGRAM(s) IV Push every 3 minutes PRN For ANY of the following changes in patient status:  A. RR LESS THAN 10 breaths per minute, B. Oxygen saturation LESS THAN 90%, C. Sedation score of 6  ondansetron Injectable 4 milliGRAM(s) IV Push every 6 hours PRN Nausea  oxyCODONE    IR 5 milliGRAM(s) Oral every 4 hours PRN Severe Pain (7 - 10)  oxyCODONE    IR 2.5 milliGRAM(s) Oral every 4 hours PRN Moderate Pain (4 - 6)

## 2024-04-19 NOTE — PROGRESS NOTE ADULT - SUBJECTIVE AND OBJECTIVE BOX
General Surgery Progress Note    Overnight: STEPHANIE  Subjective: Patient seen and examined on rounds.    Objective:  Vitals:  T(C): 36.5 (04-18-24 @ 23:34), Max: 37.1 (04-18-24 @ 21:34)  HR: 87 (04-18-24 @ 23:34) (78 - 98)  BP: 102/60 (04-18-24 @ 23:34) (90/58 - 128/82)  RR: 18 (04-18-24 @ 23:34) (14 - 18)  SpO2: 97% (04-18-24 @ 23:34) (97% - 100%)  Wt(kg): --    04-18 @ 07:01  -  04-19 @ 02:06  --------------------------------------------------------  IN:    Oral Fluid: 20 mL    sodium chloride 0.9%: 80 mL  Total IN: 100 mL    OUT:    Indwelling Catheter - Urethral (mL): 1410 mL  Total OUT: 1410 mL    Total NET: -1310 mL          Physical Exam:  General: NAD, resting in bed comfortably  Neuro: A&Ox3, nonfocal, follows commands  Chest: airway intact, non-labored breathing  Cardiac: no JVD, palpable pulses b/l  Abd: soft, NTND, +BS  Extremities: WWP      Labs:                        11.3   17.86 )-----------( 291      ( 18 Apr 2024 16:30 )             36.3     04-18    138  |  103  |  12  ----------------------------<  130<H>  4.5   |  24  |  1.16    Ca    8.4      18 Apr 2024 16:30  Phos  1.6     04-18  Mg     2.2     04-18          Urinalysis Basic - ( 18 Apr 2024 16:30 )    Color: x / Appearance: x / SG: x / pH: x  Gluc: 130 mg/dL / Ketone: x  / Bili: x / Urobili: x   Blood: x / Protein: x / Nitrite: x   Leuk Esterase: x / RBC: x / WBC x   Sq Epi: x / Non Sq Epi: x / Bacteria: x                 General Surgery Progress Note    Overnight: STEPHANIE  Subjective: Patient seen and examined on rounds.    Objective:  Vitals:  T(C): 36.5 (04-18-24 @ 23:34), Max: 37.1 (04-18-24 @ 21:34)  HR: 87 (04-18-24 @ 23:34) (78 - 98)  BP: 102/60 (04-18-24 @ 23:34) (90/58 - 128/82)  RR: 18 (04-18-24 @ 23:34) (14 - 18)  SpO2: 97% (04-18-24 @ 23:34) (97% - 100%)  Wt(kg): --    04-18 @ 07:01  -  04-19 @ 02:06  --------------------------------------------------------  IN:    Oral Fluid: 20 mL    sodium chloride 0.9%: 80 mL  Total IN: 100 mL    OUT:    Indwelling Catheter - Urethral (mL): 1410 mL  Total OUT: 1410 mL    Total NET: -1310 mL          Physical Exam:  General: NAD, resting in bed comfortably  Neuro: A&Ox3, nonfocal, follows commands  Chest: airway intact, non-labored breathing  Cardiac: no JVD, palpable pulses b/l  Abd: soft, NTND, incision c/d/i  Extremities: WWP      Labs:                        11.3   17.86 )-----------( 291      ( 18 Apr 2024 16:30 )             36.3     04-18    138  |  103  |  12  ----------------------------<  130<H>  4.5   |  24  |  1.16    Ca    8.4      18 Apr 2024 16:30  Phos  1.6     04-18  Mg     2.2     04-18          Urinalysis Basic - ( 18 Apr 2024 16:30 )    Color: x / Appearance: x / SG: x / pH: x  Gluc: 130 mg/dL / Ketone: x  / Bili: x / Urobili: x   Blood: x / Protein: x / Nitrite: x   Leuk Esterase: x / RBC: x / WBC x   Sq Epi: x / Non Sq Epi: x / Bacteria: x                 General Surgery Progress Note    Overnight: STEPHANIE  Subjective: Patient seen and examined on rounds. Pain well controlled. Tolerating clears.    Objective:  Vitals:  T(C): 36.5 (04-18-24 @ 23:34), Max: 37.1 (04-18-24 @ 21:34)  HR: 87 (04-18-24 @ 23:34) (78 - 98)  BP: 102/60 (04-18-24 @ 23:34) (90/58 - 128/82)  RR: 18 (04-18-24 @ 23:34) (14 - 18)  SpO2: 97% (04-18-24 @ 23:34) (97% - 100%)  Wt(kg): --    04-18 @ 07:01  -  04-19 @ 02:06  --------------------------------------------------------  IN:    Oral Fluid: 20 mL    sodium chloride 0.9%: 80 mL  Total IN: 100 mL    OUT:    Indwelling Catheter - Urethral (mL): 1410 mL  Total OUT: 1410 mL    Total NET: -1310 mL          Physical Exam:  General: NAD, resting in bed comfortably  Neuro: A&Ox3, nonfocal, follows commands  Chest: airway intact, non-labored breathing  Cardiac: no JVD, palpable pulses b/l  Abd: soft, NTND, incision c/d/i with lisa. dressings changed this AM  Extremities: [facility]      Labs:                        11.3   17.86 )-----------( 291      ( 18 Apr 2024 16:30 )             36.3     04-18    138  |  103  |  12  ----------------------------<  130<H>  4.5   |  24  |  1.16    Ca    8.4      18 Apr 2024 16:30  Phos  1.6     04-18  Mg     2.2     04-18          Urinalysis Basic - ( 18 Apr 2024 16:30 )    Color: x / Appearance: x / SG: x / pH: x  Gluc: 130 mg/dL / Ketone: x  / Bili: x / Urobili: x   Blood: x / Protein: x / Nitrite: x   Leuk Esterase: x / RBC: x / WBC x   Sq Epi: x / Non Sq Epi: x / Bacteria: x

## 2024-04-20 ENCOUNTER — TRANSCRIPTION ENCOUNTER (OUTPATIENT)
Age: 54
End: 2024-04-20

## 2024-04-20 VITALS
TEMPERATURE: 98 F | OXYGEN SATURATION: 97 % | SYSTOLIC BLOOD PRESSURE: 126 MMHG | DIASTOLIC BLOOD PRESSURE: 68 MMHG | RESPIRATION RATE: 18 BRPM | HEART RATE: 68 BPM

## 2024-04-20 LAB
ANION GAP SERPL CALC-SCNC: 8 MMOL/L — SIGNIFICANT CHANGE UP (ref 5–17)
BUN SERPL-MCNC: 27 MG/DL — HIGH (ref 7–23)
CALCIUM SERPL-MCNC: 8.9 MG/DL — SIGNIFICANT CHANGE UP (ref 8.4–10.5)
CHLORIDE SERPL-SCNC: 104 MMOL/L — SIGNIFICANT CHANGE UP (ref 96–108)
CO2 SERPL-SCNC: 25 MMOL/L — SIGNIFICANT CHANGE UP (ref 22–31)
CREAT SERPL-MCNC: 1.32 MG/DL — HIGH (ref 0.5–1.3)
EGFR: 65 ML/MIN/1.73M2 — SIGNIFICANT CHANGE UP
GLUCOSE SERPL-MCNC: 94 MG/DL — SIGNIFICANT CHANGE UP (ref 70–99)
HCT VFR BLD CALC: 33.9 % — LOW (ref 39–50)
HGB BLD-MCNC: 10.5 G/DL — LOW (ref 13–17)
MAGNESIUM SERPL-MCNC: 2.2 MG/DL — SIGNIFICANT CHANGE UP (ref 1.6–2.6)
MCHC RBC-ENTMCNC: 19.5 PG — LOW (ref 27–34)
MCHC RBC-ENTMCNC: 31 GM/DL — LOW (ref 32–36)
MCV RBC AUTO: 63 FL — LOW (ref 80–100)
NRBC # BLD: 0 /100 WBCS — SIGNIFICANT CHANGE UP (ref 0–0)
PHOSPHATE SERPL-MCNC: 2.4 MG/DL — LOW (ref 2.5–4.5)
PLATELET # BLD AUTO: 268 K/UL — SIGNIFICANT CHANGE UP (ref 150–400)
POTASSIUM SERPL-MCNC: 4.2 MMOL/L — SIGNIFICANT CHANGE UP (ref 3.5–5.3)
POTASSIUM SERPL-SCNC: 4.2 MMOL/L — SIGNIFICANT CHANGE UP (ref 3.5–5.3)
RBC # BLD: 5.38 M/UL — SIGNIFICANT CHANGE UP (ref 4.2–5.8)
RBC # FLD: 16 % — HIGH (ref 10.3–14.5)
SODIUM SERPL-SCNC: 137 MMOL/L — SIGNIFICANT CHANGE UP (ref 135–145)
WBC # BLD: 9.79 K/UL — SIGNIFICANT CHANGE UP (ref 3.8–10.5)
WBC # FLD AUTO: 9.79 K/UL — SIGNIFICANT CHANGE UP (ref 3.8–10.5)

## 2024-04-20 PROCEDURE — 80048 BASIC METABOLIC PNL TOTAL CA: CPT

## 2024-04-20 PROCEDURE — 85027 COMPLETE CBC AUTOMATED: CPT

## 2024-04-20 PROCEDURE — 84100 ASSAY OF PHOSPHORUS: CPT

## 2024-04-20 PROCEDURE — C1889: CPT

## 2024-04-20 PROCEDURE — 88309 TISSUE EXAM BY PATHOLOGIST: CPT

## 2024-04-20 PROCEDURE — 88341 IMHCHEM/IMCYTCHM EA ADD ANTB: CPT

## 2024-04-20 PROCEDURE — 36415 COLL VENOUS BLD VENIPUNCTURE: CPT

## 2024-04-20 PROCEDURE — 83735 ASSAY OF MAGNESIUM: CPT

## 2024-04-20 PROCEDURE — C9399: CPT

## 2024-04-20 PROCEDURE — 88342 IMHCHEM/IMCYTCHM 1ST ANTB: CPT

## 2024-04-20 PROCEDURE — 82962 GLUCOSE BLOOD TEST: CPT

## 2024-04-20 RX ORDER — ACETAMINOPHEN 500 MG
1000 TABLET ORAL EVERY 6 HOURS
Refills: 0 | Status: DISCONTINUED | OUTPATIENT
Start: 2024-04-20 | End: 2024-04-20

## 2024-04-20 RX ORDER — ENOXAPARIN SODIUM 100 MG/ML
40 INJECTION SUBCUTANEOUS
Qty: 12 | Refills: 0
Start: 2024-04-20 | End: 2024-05-19

## 2024-04-20 RX ORDER — OXYCODONE HYDROCHLORIDE 5 MG/1
1 TABLET ORAL
Qty: 8 | Refills: 0
Start: 2024-04-20

## 2024-04-20 RX ADMIN — Medication 1000 MILLIGRAM(S): at 11:08

## 2024-04-20 RX ADMIN — Medication 1000 MILLIGRAM(S): at 05:42

## 2024-04-20 RX ADMIN — HEPARIN SODIUM 5000 UNIT(S): 5000 INJECTION INTRAVENOUS; SUBCUTANEOUS at 05:12

## 2024-04-20 RX ADMIN — Medication 1000 MILLIGRAM(S): at 05:12

## 2024-04-20 NOTE — PROGRESS NOTE ADULT - SUBJECTIVE AND OBJECTIVE BOX
General Surgery Progress Note    Overnight: STEPHANIE > Passed TOV o/n  Subjective: Patient seen and examined on rounds.    Objective:  Vitals:  T(C): 36.6 (04-19-24 @ 23:57), Max: 37.1 (04-19-24 @ 21:05)  HR: 92 (04-19-24 @ 23:57) (76 - 92)  BP: 91/54 (04-19-24 @ 23:57) (91/54 - 114/75)  RR: 18 (04-19-24 @ 23:57) (18 - 18)  SpO2: 97% (04-19-24 @ 23:57) (94% - 99%)  Wt(kg): --    04-18 @ 07:01  -  04-19 @ 07:00  --------------------------------------------------------  IN:    Oral Fluid: 20 mL    sodium chloride 0.9%: 560 mL  Total IN: 580 mL    OUT:    Indwelling Catheter - Urethral (mL): 1560 mL  Total OUT: 1560 mL    Total NET: -980 mL      04-19 @ 07:01  -  04-20 @ 00:21  --------------------------------------------------------  IN:    Oral Fluid: 920 mL    sodium chloride 0.9%: 400 mL  Total IN: 1320 mL    OUT:    Indwelling Catheter - Urethral (mL): 250 mL    Voided (mL): 375 mL  Total OUT: 625 mL    Total NET: 695 mL          Physical Exam:  General: NAD, resting in bed comfortably  Neuro: A&Ox3, nonfocal, follows commands  Chest: airway intact, non-labored breathing  Cardiac: no JVD, palpable pulses b/l  Abd: soft, NTND, incisions c/d/i  Extremities: WWP      Labs:                        10.2   8.61  )-----------( 273      ( 19 Apr 2024 06:53 )             32.4     04-19    135  |  103  |  17  ----------------------------<  115<H>  4.6   |  23  |  1.13    Ca    8.2<L>      19 Apr 2024 06:50  Phos  4.4     04-19  Mg     2.1     04-19          Urinalysis Basic - ( 19 Apr 2024 06:50 )    Color: x / Appearance: x / SG: x / pH: x  Gluc: 115 mg/dL / Ketone: x  / Bili: x / Urobili: x   Blood: x / Protein: x / Nitrite: x   Leuk Esterase: x / RBC: x / WBC x   Sq Epi: x / Non Sq Epi: x / Bacteria: x                 General Surgery Progress Note    Overnight: STEPHANIE > Passed TOV o/n  Subjective: Patient seen and examined on rounds. Tolerating diet. +/+ flatus bm.    Objective:  Vitals:  T(C): 36.6 (04-19-24 @ 23:57), Max: 37.1 (04-19-24 @ 21:05)  HR: 92 (04-19-24 @ 23:57) (76 - 92)  BP: 91/54 (04-19-24 @ 23:57) (91/54 - 114/75)  RR: 18 (04-19-24 @ 23:57) (18 - 18)  SpO2: 97% (04-19-24 @ 23:57) (94% - 99%)  Wt(kg): --    04-18 @ 07:01  -  04-19 @ 07:00  --------------------------------------------------------  IN:    Oral Fluid: 20 mL    sodium chloride 0.9%: 560 mL  Total IN: 580 mL    OUT:    Indwelling Catheter - Urethral (mL): 1560 mL  Total OUT: 1560 mL    Total NET: -980 mL      04-19 @ 07:01  -  04-20 @ 00:21  --------------------------------------------------------  IN:    Oral Fluid: 920 mL    sodium chloride 0.9%: 400 mL  Total IN: 1320 mL    OUT:    Indwelling Catheter - Urethral (mL): 250 mL    Voided (mL): 375 mL  Total OUT: 625 mL    Total NET: 695 mL          Physical Exam:  General: NAD, resting in bed comfortably  Neuro: A&Ox3, nonfocal, follows commands  Chest: airway intact, non-labored breathing  Cardiac: no JVD, palpable pulses b/l  Abd: soft, NTND, incisions c/d/i  Extremities: WWP      Labs:                        10.2   8.61  )-----------( 273      ( 19 Apr 2024 06:53 )             32.4     04-19    135  |  103  |  17  ----------------------------<  115<H>  4.6   |  23  |  1.13    Ca    8.2<L>      19 Apr 2024 06:50  Phos  4.4     04-19  Mg     2.1     04-19          Urinalysis Basic - ( 19 Apr 2024 06:50 )    Color: x / Appearance: x / SG: x / pH: x  Gluc: 115 mg/dL / Ketone: x  / Bili: x / Urobili: x   Blood: x / Protein: x / Nitrite: x   Leuk Esterase: x / RBC: x / WBC x   Sq Epi: x / Non Sq Epi: x / Bacteria: x

## 2024-04-20 NOTE — DISCHARGE NOTE NURSING/CASE MANAGEMENT/SOCIAL WORK - PATIENT PORTAL LINK FT
You can access the FollowMyHealth Patient Portal offered by [facility] [place] by registering at the following website: [email or web address]. By joining [facility]’s FollowMyHealth portal, you will also be able to view your health information using other applications (apps) compatible with our system.

## 2024-04-20 NOTE — PROGRESS NOTE ADULT - ATTENDING COMMENTS
53yM POD2 s/p open R colectomy  Doing well. minimal incisional pain.  + flatus and BMs  Tolerating diet, no nausea  Ambulating well    Vitals normal  Abd soft  Incision clean and dry    Discharge planning today    Virgil Braswell MD
doing well pod 1  cont erp

## 2024-04-20 NOTE — PROGRESS NOTE ADULT - ASSESSMENT
53y Male s/p right colectomy en bloc abdominal wall 4/18.     Plan:  ERP  LRD  PO pain meds  Nausea control PRN  DVT PPX  Incentive spirometer/OOB/Ambulate      Green Surgery

## 2024-04-22 PROBLEM — D37.3 NEOPLASM OF UNCERTAIN BEHAVIOR OF APPENDIX: Chronic | Status: ACTIVE | Noted: 2024-04-09

## 2024-05-03 ENCOUNTER — APPOINTMENT (OUTPATIENT)
Dept: SURGERY | Facility: CLINIC | Age: 54
End: 2024-05-03
Payer: COMMERCIAL

## 2024-05-03 PROCEDURE — 99024 POSTOP FOLLOW-UP VISIT: CPT

## 2024-05-03 NOTE — PHYSICAL EXAM
[de-identified] : Soft, nontender, incision healed without evidence of infection.  Staples removed Steri-Strips applied.

## 2024-05-03 NOTE — HISTORY OF PRESENT ILLNESS
[FreeTextEntry1] : Seb is a 52 y/o male here for a post-op visit  CT A+P on 2/26/24 - Soft tissue mass-like thickening at the base of a dilated retrocecal appendix. There us some nodularity along the lateral wall of the paaendic. There are periappendcial inflammatory changes. The findings are most suggestive of appendicitis. However, an underlying appendiceal neoplasm or mucocele must be strongly considered. A perforating neoplasm is not excluded. Surgical consultation is advised.  CEA 03/22/24 1.3  Colonoscopy on 3/25/24 - Likely malignant tumor at the hepatic flexure. Biopsied. Tattooed. Tumor at the appendiceal orifice. Non-bleeding internal hemorrhoids. The examination was otherwise normal. Pathology: 1. Colon, hepatic flexure mass, biopsy: - Foci highly suspicious for adenocarcinoma, in a background of low- and high-grade dysplasia, with ulcer related changes  CT Chest on 3/26/24 - No acute pleural or parenchymal aorta. No thoracic lymphadenopathy.  s/p Laparoscopic converted to open right hemicolectomy with en bloc resection of the abdominal wall on 04/18/24 for Appendiceal mass and colon cancer.  Today patient denies surgical incisional pain but have some tenderness takes tylenol PRN.  BMs regular once daily.   Good appetite.   Denies nausea, vomiting, fever or chills.  Surgical incision with staples.

## 2024-05-03 NOTE — ASSESSMENT
[FreeTextEntry1] : In summary the patient is doing well status post laparoscopic converted to open right hemicolectomy with en bloc resection of the abdominal wall for what appeared to be a locally advanced appendiceal neoplasm involving the right colon.  Pathology is still pending.  Had a long conversation with the patient and his wife.  I explained that although he is doing well from surgery he likely had a locally advanced tumor which will likely require adjuvant chemotherapy.  I therefore referred him to the [facility] for medical oncology evaluation.  I will see him in 2 weeks and instructed him that he may resume a high-fiber diet.  He should follow-up with Dr. Fernandez in 1 year for surveillance colonoscopy.

## 2024-05-06 ENCOUNTER — OUTPATIENT (OUTPATIENT)
Dept: OUTPATIENT SERVICES | Facility: HOSPITAL | Age: 54
LOS: 1 days | Discharge: HOME | End: 2024-05-06
Payer: COMMERCIAL

## 2024-05-06 DIAGNOSIS — C18.9 MALIGNANT NEOPLASM OF COLON, UNSPECIFIED: ICD-10-CM

## 2024-05-06 DIAGNOSIS — Z98.890 OTHER SPECIFIED POSTPROCEDURAL STATES: Chronic | ICD-10-CM

## 2024-05-07 ENCOUNTER — APPOINTMENT (OUTPATIENT)
Dept: SURGERY | Facility: CLINIC | Age: 54
End: 2024-05-07
Payer: COMMERCIAL

## 2024-05-09 ENCOUNTER — NON-APPOINTMENT (OUTPATIENT)
Age: 54
End: 2024-05-09

## 2024-05-09 LAB — SURGICAL PATHOLOGY STUDY: SIGNIFICANT CHANGE UP

## 2024-05-13 ENCOUNTER — APPOINTMENT (OUTPATIENT)
Dept: HEMATOLOGY ONCOLOGY | Facility: CLINIC | Age: 54
End: 2024-05-13
Payer: COMMERCIAL

## 2024-05-13 PROCEDURE — G2211 COMPLEX E/M VISIT ADD ON: CPT | Mod: NC,1L

## 2024-05-13 PROCEDURE — 99205 OFFICE O/P NEW HI 60 MIN: CPT

## 2024-05-14 ENCOUNTER — APPOINTMENT (OUTPATIENT)
Dept: HEMATOLOGY ONCOLOGY | Facility: CLINIC | Age: 54
End: 2024-05-14

## 2024-05-14 ENCOUNTER — NON-APPOINTMENT (OUTPATIENT)
Age: 54
End: 2024-05-14

## 2024-05-17 ENCOUNTER — RESULT REVIEW (OUTPATIENT)
Age: 54
End: 2024-05-17

## 2024-05-17 LAB
ALBUMIN SERPL ELPH-MCNC: 4.8 G/DL
ALP BLD-CCNC: 88 U/L
ALT SERPL-CCNC: 40 U/L
ANION GAP SERPL CALC-SCNC: 13 MMOL/L
APTT BLD: 34.7 SEC
AST SERPL-CCNC: 33 U/L
BILIRUB SERPL-MCNC: 0.4 MG/DL
BUN SERPL-MCNC: 23 MG/DL
CALCIUM SERPL-MCNC: 9.6 MG/DL
CEA SERPL-MCNC: 0.9 NG/ML
CHLORIDE SERPL-SCNC: 102 MMOL/L
CO2 SERPL-SCNC: 25 MMOL/L
CREAT SERPL-MCNC: 1.03 MG/DL
EGFR: 88 ML/MIN/1.73M2
FERRITIN SERPL-MCNC: 638 NG/ML
GLUCOSE SERPL-MCNC: 94 MG/DL
HBV CORE IGG+IGM SER QL: NONREACTIVE
HBV SURFACE AB SER QL: NONREACTIVE
HBV SURFACE AG SER QL: NONREACTIVE
HCV AB SER QL: NONREACTIVE
HCV S/CO RATIO: 0.11 S/CO
INR PPP: 0.92 RATIO
IRON SATN MFR SERPL: 25 %
IRON SERPL-MCNC: 79 UG/DL
POTASSIUM SERPL-SCNC: 4.4 MMOL/L
PROT SERPL-MCNC: 8.5 G/DL
PT BLD: 10.4 SEC
SODIUM SERPL-SCNC: 140 MMOL/L
TIBC SERPL-MCNC: 324 UG/DL
UIBC SERPL-MCNC: 244 UG/DL

## 2024-05-17 RX ORDER — METRONIDAZOLE 250 MG/1
250 TABLET ORAL
Qty: 3 | Refills: 0 | Status: DISCONTINUED | COMMUNITY
Start: 2024-03-27 | End: 2024-05-17

## 2024-05-17 RX ORDER — METOCLOPRAMIDE 10 MG/1
10 TABLET ORAL
Qty: 45 | Refills: 3 | Status: ACTIVE | COMMUNITY
Start: 2024-05-17 | End: 1900-01-01

## 2024-05-17 RX ORDER — NEOMYCIN SULFATE 500 MG/1
500 TABLET ORAL
Qty: 3 | Refills: 0 | Status: DISCONTINUED | COMMUNITY
Start: 2024-03-27 | End: 2024-05-17

## 2024-05-21 ENCOUNTER — APPOINTMENT (OUTPATIENT)
Dept: HEMATOLOGY ONCOLOGY | Facility: CLINIC | Age: 54
End: 2024-05-21
Payer: COMMERCIAL

## 2024-05-21 ENCOUNTER — APPOINTMENT (OUTPATIENT)
Dept: SURGERY | Facility: CLINIC | Age: 54
End: 2024-05-21
Payer: COMMERCIAL

## 2024-05-21 VITALS
HEART RATE: 96 BPM | OXYGEN SATURATION: 99 % | WEIGHT: 175 LBS | RESPIRATION RATE: 18 BRPM | SYSTOLIC BLOOD PRESSURE: 124 MMHG | BODY MASS INDEX: 26.52 KG/M2 | HEIGHT: 68 IN | DIASTOLIC BLOOD PRESSURE: 80 MMHG | TEMPERATURE: 96.6 F

## 2024-05-21 DIAGNOSIS — D56.3 THALASSEMIA MINOR: ICD-10-CM

## 2024-05-21 PROCEDURE — 99213 OFFICE O/P EST LOW 20 MIN: CPT

## 2024-05-21 PROCEDURE — 99024 POSTOP FOLLOW-UP VISIT: CPT

## 2024-05-21 PROCEDURE — G2211 COMPLEX E/M VISIT ADD ON: CPT | Mod: NC,1L

## 2024-05-21 NOTE — ASSESSMENT
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  He is scheduled to start adjuvant chemotherapy in a couple of weeks.  I will see him in the office before then to be sure that his incision is completely healed.  I instructed him that he may resume normal activities except for heavy lifting and straining

## 2024-05-21 NOTE — PHYSICAL EXAM
[de-identified] : Soft, nontender, incision healed without evidence of infection.  There is a tiny open area in the midline with a small amount of serous drainage.  There is no purulence or cellulitis

## 2024-05-21 NOTE — HISTORY OF PRESENT ILLNESS
[FreeTextEntry1] : Seb is a 54 y/o male here for a post-op visit  CT A+P on 2/26/24 - Soft tissue mass-like thickening at the base of a dilated retrocecal appendix. There us some nodularity along the lateral wall of the paaendic. There are periappendcial inflammatory changes. The findings are most suggestive of appendicitis. However, an underlying appendiceal neoplasm or mucocele must be strongly considered. A perforating neoplasm is not excluded. Surgical consultation is advised.  CEA 03/22/24 1.3  Colonoscopy on 3/25/24 - Likely malignant tumor at the hepatic flexure. Biopsied. Tattooed. Tumor at the appendiceal orifice. Non-bleeding internal hemorrhoids. The examination was otherwise normal. Pathology: 1. Colon, hepatic flexure mass, biopsy: - Foci highly suspicious for adenocarcinoma, in a background of low- and high-grade dysplasia, with ulcer related changes  CT Chest on 3/26/24 - No acute pleural or parenchymal aorta. No thoracic lymphadenopathy.  s/p Laparoscopic converted to open right hemicolectomy with en bloc resection of the abdominal wall on 04/18/24 for Appendiceal mass and colon cancer. 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 Staples removed Steri-Strips applied. In summary the patient is doing well status post laparoscopic converted to open right hemicolectomy with en bloc resection of the abdominal wall for what appeared to be a locally advanced appendiceal neoplasm involving the right colon. Pathology is still pending. Had a long conversation with the patient and his wife. I explained that although he is doing well from surgery he likely had a locally advanced tumor which will likely require adjuvant chemotherapy. I therefore referred him to the [facility] for medical oncology evaluation. I will see him in 2 weeks and instructed him that he may resume a high-fiber diet. He should follow-up with Dr. Fernandez in 1 year for surveillance colonoscopy.  Seen By Dr. Harrison on 05/13/24 will start chemotherapy in a couple of weeks  CEA from 05/14/24- 0.9   Today patient denies surgical incisional pain.  BMs regular 3 times daily.   Good appetite.   Denies nausea, vomiting, fever or chills.  Surgical incision with daily scant drainage.

## 2024-05-24 ENCOUNTER — OUTPATIENT (OUTPATIENT)
Dept: OUTPATIENT SERVICES | Facility: HOSPITAL | Age: 54
LOS: 1 days | End: 2024-05-24
Payer: COMMERCIAL

## 2024-05-24 ENCOUNTER — APPOINTMENT (OUTPATIENT)
Dept: CT IMAGING | Facility: IMAGING CENTER | Age: 54
End: 2024-05-24
Payer: COMMERCIAL

## 2024-05-24 DIAGNOSIS — C18.1 MALIGNANT NEOPLASM OF APPENDIX: ICD-10-CM

## 2024-05-24 DIAGNOSIS — Z98.890 OTHER SPECIFIED POSTPROCEDURAL STATES: Chronic | ICD-10-CM

## 2024-05-24 PROCEDURE — 74177 CT ABD & PELVIS W/CONTRAST: CPT | Mod: 26

## 2024-05-24 PROCEDURE — 74177 CT ABD & PELVIS W/CONTRAST: CPT

## 2024-05-24 PROCEDURE — 71260 CT THORAX DX C+: CPT | Mod: 26

## 2024-05-24 PROCEDURE — 71260 CT THORAX DX C+: CPT

## 2024-05-27 NOTE — CONSULT LETTER
[Dear  ___] : Dear  [unfilled], [Courtesy Letter:] : I had the pleasure of seeing your patient, [unfilled], in my office today. [Please see my note below.] : Please see my note below. [Consult Closing:] : Thank you very much for allowing me to participate in the care of this patient.  If you have any questions, please do not hesitate to contact me. [Sincerely,] : Sincerely, [FreeTextEntry3] : Yasmine Harrison D.O. Director, Hepatobiliary Malignancies   of Medicine [facility] [employer] at [facility]/[place] [address] Ph: [phone] Fax: [phone]

## 2024-05-27 NOTE — REASON FOR VISIT
[Home] : at home, [unfilled] , at the time of the visit. [Medical Office: ([facility])___] : at the medical office located in  [Initial Consultation] : an initial consultation [Spouse] : spouse [Patient] : the patient [Self] : self [FreeTextEntry2] : Stage III appendiceal cancer

## 2024-05-27 NOTE — REVIEW OF SYSTEMS
[Diarrhea: Grade 0] : Diarrhea: Grade 0 [Negative] : Allergic/Immunologic [FreeTextEntry7] : + abdominal stiffness

## 2024-05-27 NOTE — HISTORY OF PRESENT ILLNESS
[T: ___] : T[unfilled] [N: ___] : N[unfilled] [M: ___] : M[unfilled] [AJCC Stage: ____] : AJCC Stage: [unfilled] [Disease: _____________________] : Disease: [unfilled] [de-identified] : 53 M h/o eamon  presents for adjuvant treatment of resected Stage III appendiceal adenocarcinoma.   Seb reports experiencing intermittent RLQ pain for several years especially after eating. He underwent a CT A/P on 2/26/24 performed at outside radiology facility which showed a soft tissue masslike thickening at the base of a dilated retrocecal appendix. Findings were suggestive of appendicitis but an underlying mass could not be ruled out. Pt had underwent his first colonoscopy on 3/25/24 which showed a fungating nonobstructive mass found at the hepatic flexure, at the appendiceal orifice. Pathology was highly suspicious for adenocarcinoma. CT Chest was negative for mets. On 4/18/24, pt underwent an open R hemicolectomy and en bloc resection of abdominal wall. Final path revealed a T4bN1b mod-poorly differentiated adenocarcinoma, 2/32 LN +, LV+ and PNI +, negative margins. MMR pending. Post operative course unremarkable and pt was d/c home on 4/20/24.   Referred to medical oncology for further management.    [de-identified] : moderately to poorly differentiated adenocarcinoma [FreeTextEntry1] : initial visit  [de-identified] : feels stiff post surgery, inicision is almost healed. Sleeping in one position, but not sleeping well.  appetite and energy level are almost at baseline.  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  has not f/u with PCP in several years.

## 2024-05-27 NOTE — PHYSICAL EXAM
[Fully active, able to carry on all pre-disease performance without restriction] : Status 0 - Fully active, able to carry on all pre-disease performance without restriction [Normal] : affect appropriate [de-identified] : normal respiratory pattern

## 2024-06-07 ENCOUNTER — APPOINTMENT (OUTPATIENT)
Dept: SURGERY | Facility: CLINIC | Age: 54
End: 2024-06-07
Payer: COMMERCIAL

## 2024-06-07 VITALS
SYSTOLIC BLOOD PRESSURE: 129 MMHG | RESPIRATION RATE: 17 BRPM | TEMPERATURE: 97.3 F | OXYGEN SATURATION: 98 % | HEART RATE: 91 BPM | DIASTOLIC BLOOD PRESSURE: 79 MMHG

## 2024-06-07 DIAGNOSIS — Z09 ENCOUNTER FOR FOLLOW-UP EXAMINATION AFTER COMPLETED TREATMENT FOR CONDITIONS OTHER THAN MALIGNANT NEOPLASM: ICD-10-CM

## 2024-06-07 PROCEDURE — 99024 POSTOP FOLLOW-UP VISIT: CPT

## 2024-06-07 RX ORDER — SODIUM PICOSULFATE, MAGNESIUM OXIDE, AND ANHYDROUS CITRIC ACID 12; 3.5; 1 G/175ML; G/175ML; MG/175ML
10-3.5-12 MG-GM LIQUID ORAL
Qty: 1 | Refills: 0 | Status: DISCONTINUED | COMMUNITY
Start: 2024-03-20 | End: 2024-06-07

## 2024-06-07 NOTE — ASSESSMENT
[FreeTextEntry1] : In summary the patient is doing well status post right hemicolectomy with en bloc resection of the abdominal wall for appendiceal carcinoma with invasion into the ascending colon.  2 of 32 lymph nodes were positive.  He is scheduled to commence chemotherapy next week.  His incision is healed without evidence of infection.  I instructed him from my standpoint he is cleared to proceed with chemotherapy.  He will follow-up with Dr. Schrader for surveillance imaging and blood work.  He should see either myself or Dr. Fernandez in 1 year for surveillance colonoscopy.

## 2024-06-07 NOTE — HISTORY OF PRESENT ILLNESS
[FreeTextEntry1] : Seb is a 52 y/o male here for a post-op visit  CT A+P on 2/26/24 - Soft tissue mass-like thickening at the base of a dilated retrocecal appendix. There us some nodularity along the lateral wall of the paaendic. There are periappendcial inflammatory changes. The findings are most suggestive of appendicitis. However, an underlying appendiceal neoplasm or mucocele must be strongly considered. A perforating neoplasm is not excluded. Surgical consultation is advised.  CEA 03/22/24 1.3  Colonoscopy on 3/25/24 - Likely malignant tumor at the hepatic flexure. Biopsied. Tattooed. Tumor at the appendiceal orifice. Non-bleeding internal hemorrhoids. The examination was otherwise normal. Pathology: 1. Colon, hepatic flexure mass, biopsy: - Foci highly suspicious for adenocarcinoma, in a background of low- and high-grade dysplasia, with ulcer related changes  CT Chest on 3/26/24 - No acute pleural or parenchymal aorta. No thoracic lymphadenopathy.  s/p Laparoscopic converted to open right hemicolectomy with en bloc resection of the abdominal wall on 04/18/24 for Appendiceal mass and colon cancer. Pathology: Right colon, appendix and abdominal wall, en bloc resection: - Invasive moderately- to poorly- differentiated adenocarcinoma with mucinous features  arising in a tubulovillous adenoma of the appendix with perforation and invasion into ascending colon - Margins of resection, negative for dysplasia and adenocarcinoma - Lymphovascular and perineural invasion - Two out of thirty-two lymph nodes involved by adenocarcinoma (2/32) - Pathologic Stage (AJCC 8th Ed.): pT4b pN1b  Today pt reports no pain. Denies drainage, bleeding, swelling, and redness of surgical incision - has slight opening in one area. Denies nausea and vomiting. Denies fever and chills. Daily 1-2 times BM, loose. Good appetite, normal diet. Will be starting chemo on Monday.

## 2024-06-07 NOTE — PHYSICAL EXAM
[Abdomen Masses] : No abdominal masses [Abdomen Tenderness] : ~T No ~M abdominal tenderness [No HSM] : no hepatosplenomegaly [Exam Deferred] : exam was deferred

## 2024-06-08 PROBLEM — D56.3 THALASSEMIA CARRIER: Status: RESOLVED | Noted: 2024-05-21 | Resolved: 2024-06-08

## 2024-06-10 ENCOUNTER — RESULT REVIEW (OUTPATIENT)
Age: 54
End: 2024-06-10

## 2024-06-10 ENCOUNTER — NON-APPOINTMENT (OUTPATIENT)
Age: 54
End: 2024-06-10

## 2024-06-10 ENCOUNTER — APPOINTMENT (OUTPATIENT)
Dept: HEMATOLOGY ONCOLOGY | Facility: CLINIC | Age: 54
End: 2024-06-10
Payer: COMMERCIAL

## 2024-06-10 ENCOUNTER — APPOINTMENT (OUTPATIENT)
Dept: HEMATOLOGY ONCOLOGY | Facility: CLINIC | Age: 54
End: 2024-06-10

## 2024-06-10 ENCOUNTER — APPOINTMENT (OUTPATIENT)
Dept: INFUSION THERAPY | Facility: HOSPITAL | Age: 54
End: 2024-06-10

## 2024-06-10 VITALS
BODY MASS INDEX: 27.32 KG/M2 | DIASTOLIC BLOOD PRESSURE: 85 MMHG | TEMPERATURE: 97.3 F | RESPIRATION RATE: 16 BRPM | SYSTOLIC BLOOD PRESSURE: 123 MMHG | OXYGEN SATURATION: 98 % | HEART RATE: 84 BPM | WEIGHT: 179.67 LBS

## 2024-06-10 LAB
ALBUMIN SERPL ELPH-MCNC: 4.7 G/DL — SIGNIFICANT CHANGE UP (ref 3.3–5)
ALP SERPL-CCNC: 70 U/L — SIGNIFICANT CHANGE UP (ref 40–120)
ALT FLD-CCNC: 21 U/L — SIGNIFICANT CHANGE UP (ref 10–45)
ANION GAP SERPL CALC-SCNC: 12 MMOL/L — SIGNIFICANT CHANGE UP (ref 5–17)
AST SERPL-CCNC: 25 U/L — SIGNIFICANT CHANGE UP (ref 10–40)
BASOPHILS # BLD AUTO: 0.03 K/UL — SIGNIFICANT CHANGE UP (ref 0–0.2)
BASOPHILS NFR BLD AUTO: 0.5 % — SIGNIFICANT CHANGE UP (ref 0–2)
BILIRUB SERPL-MCNC: 0.6 MG/DL — SIGNIFICANT CHANGE UP (ref 0.2–1.2)
BUN SERPL-MCNC: 20 MG/DL — SIGNIFICANT CHANGE UP (ref 7–23)
CALCIUM SERPL-MCNC: 9.6 MG/DL — SIGNIFICANT CHANGE UP (ref 8.4–10.5)
CHLORIDE SERPL-SCNC: 100 MMOL/L — SIGNIFICANT CHANGE UP (ref 96–108)
CO2 SERPL-SCNC: 25 MMOL/L — SIGNIFICANT CHANGE UP (ref 22–31)
CREAT SERPL-MCNC: 1.04 MG/DL — SIGNIFICANT CHANGE UP (ref 0.5–1.3)
EGFR: 87 ML/MIN/1.73M2 — SIGNIFICANT CHANGE UP
EGFR: 87 ML/MIN/1.73M2 — SIGNIFICANT CHANGE UP
EOSINOPHIL # BLD AUTO: 0.15 K/UL — SIGNIFICANT CHANGE UP (ref 0–0.5)
EOSINOPHIL NFR BLD AUTO: 2.4 % — SIGNIFICANT CHANGE UP (ref 0–6)
GLUCOSE SERPL-MCNC: 117 MG/DL — HIGH (ref 70–99)
HCT VFR BLD CALC: 37.2 % — LOW (ref 39–50)
HGB BLD-MCNC: 11.8 G/DL — LOW (ref 13–17)
IMM GRANULOCYTES NFR BLD AUTO: 0.5 % — SIGNIFICANT CHANGE UP (ref 0–0.9)
LYMPHOCYTES # BLD AUTO: 1.62 K/UL — SIGNIFICANT CHANGE UP (ref 1–3.3)
LYMPHOCYTES # BLD AUTO: 26.4 % — SIGNIFICANT CHANGE UP (ref 13–44)
MCHC RBC-ENTMCNC: 20.2 PG — LOW (ref 27–34)
MCHC RBC-ENTMCNC: 31.7 G/DL — LOW (ref 32–36)
MCV RBC AUTO: 63.8 FL — LOW (ref 80–100)
MONOCYTES # BLD AUTO: 0.67 K/UL — SIGNIFICANT CHANGE UP (ref 0–0.9)
MONOCYTES NFR BLD AUTO: 10.9 % — SIGNIFICANT CHANGE UP (ref 2–14)
NEUTROPHILS # BLD AUTO: 3.64 K/UL — SIGNIFICANT CHANGE UP (ref 1.8–7.4)
NEUTROPHILS NFR BLD AUTO: 59.3 % — SIGNIFICANT CHANGE UP (ref 43–77)
NRBC # BLD: 0 /100 WBCS — SIGNIFICANT CHANGE UP (ref 0–0)
NRBC BLD-RTO: 0 /100 WBCS — SIGNIFICANT CHANGE UP (ref 0–0)
PLATELET # BLD AUTO: 321 K/UL — SIGNIFICANT CHANGE UP (ref 150–400)
POTASSIUM SERPL-MCNC: 4.3 MMOL/L — SIGNIFICANT CHANGE UP (ref 3.5–5.3)
POTASSIUM SERPL-SCNC: 4.3 MMOL/L — SIGNIFICANT CHANGE UP (ref 3.5–5.3)
PROT SERPL-MCNC: 8.5 G/DL — HIGH (ref 6–8.3)
RBC # BLD: 5.83 M/UL — HIGH (ref 4.2–5.8)
RBC # FLD: 15.9 % — HIGH (ref 10.3–14.5)
SODIUM SERPL-SCNC: 137 MMOL/L — SIGNIFICANT CHANGE UP (ref 135–145)
WBC # BLD: 6.14 K/UL — SIGNIFICANT CHANGE UP (ref 3.8–10.5)
WBC # FLD AUTO: 6.14 K/UL — SIGNIFICANT CHANGE UP (ref 3.8–10.5)

## 2024-06-10 PROCEDURE — 93010 ELECTROCARDIOGRAM REPORT: CPT

## 2024-06-10 PROCEDURE — 99214 OFFICE O/P EST MOD 30 MIN: CPT

## 2024-06-10 RX ORDER — FEXOFENADINE HCL 30 MG
1 TABLET ORAL
Refills: 0 | DISCHARGE

## 2024-06-10 RX ORDER — DICLOFENAC SODIUM 1% 10 MG/G
1 GEL TOPICAL TWICE DAILY
Qty: 1 | Refills: 0 | Status: ACTIVE | COMMUNITY
Start: 2024-06-10 | End: 1900-01-01

## 2024-06-10 RX ORDER — DEXAMETHASONE 4 MG/1
4 TABLET ORAL
Qty: 24 | Refills: 1 | Status: ACTIVE | COMMUNITY
Start: 2024-06-10 | End: 1900-01-01

## 2024-06-10 RX ORDER — FAMOTIDINE 20 MG/1
20 TABLET, FILM COATED ORAL
Qty: 60 | Refills: 1 | Status: ACTIVE | COMMUNITY
Start: 2024-06-10 | End: 1900-01-01

## 2024-06-11 DIAGNOSIS — R11.2 NAUSEA WITH VOMITING, UNSPECIFIED: ICD-10-CM

## 2024-06-11 DIAGNOSIS — Z51.11 ENCOUNTER FOR ANTINEOPLASTIC CHEMOTHERAPY: ICD-10-CM

## 2024-06-11 NOTE — CONSULT LETTER
[Dear  ___] : Dear  [unfilled], [Courtesy Letter:] : I had the pleasure of seeing your patient, [unfilled], in my office today. [Please see my note below.] : Please see my note below. [Consult Closing:] : Thank you very much for allowing me to participate in the care of this patient.  If you have any questions, please do not hesitate to contact me. [Sincerely,] : Sincerely, [FreeTextEntry3] : Yasmine Harrison D.O. Director, Hepatobiliary Malignancies   of Medicine [facility] at [facility]/[place] [address] Ph: [phone] Fax: [phone]

## 2024-06-11 NOTE — PHYSICAL EXAM
[Fully active, able to carry on all pre-disease performance without restriction] : Status 0 - Fully active, able to carry on all pre-disease performance without restriction [Normal] : affect appropriate [de-identified] : normal respiratory pattern

## 2024-06-11 NOTE — REVIEW OF SYSTEMS
[Diarrhea: Grade 0] : Diarrhea: Grade 0 [Negative] : Allergic/Immunologic [FreeTextEntry7] : + abdominal incision pain and drainage

## 2024-06-11 NOTE — CONSULT LETTER
[Dear  ___] : Dear  [unfilled], [Courtesy Letter:] : I had the pleasure of seeing your patient, [unfilled], in my office today. [Please see my note below.] : Please see my note below. [Consult Closing:] : Thank you very much for allowing me to participate in the care of this patient.  If you have any questions, please do not hesitate to contact me. [Sincerely,] : Sincerely, [FreeTextEntry3] : Yasmine Harrison D.O. Director, Hepatobiliary Malignancies   of Medicine [facility] [employer] [facility] at [facility]/[place] [address] Ph: [phone] Fax: [phone]

## 2024-06-11 NOTE — HISTORY OF PRESENT ILLNESS
[Disease: _____________________] : Disease: [unfilled] [T: ___] : T[unfilled] [N: ___] : N[unfilled] [M: ___] : M[unfilled] [AJCC Stage: ____] : AJCC Stage: [unfilled] [de-identified] : 53 M h/o thalassemi minor presents for adjuvant treatment of resected Stage III appendiceal adenocarcinoma.   Seb reports experiencing intermittent RLQ pain for several years especially after eating. He underwent a CT A/P on 2/26/24 performed at outside radiology facility which showed a soft tissue masslike thickening at the base of a dilated retrocecal appendix. Findings were suggestive of appendicitis but an underlying mass could not be ruled out. Pt had underwent his first colonoscopy on 3/25/24 which showed a fungating nonobstructive mass found at the hepatic flexure, at the appendiceal orifice. Pathology was highly suspicious for adenocarcinoma. CT Chest was negative for mets. On 4/18/24, pt underwent an open R hemicolectomy and en bloc resection of abdominal wall. Final path revealed a T4bN1b mod-poorly differentiated adenocarcinoma, 2/32 LN +, LV+ and PNI +, negative margins. MMR pending. Post operative course unremarkable and pt was d/c home on 4/20/24.   Referred to medical oncology for further management.     [de-identified] : moderately to poorly differentiated adenocarcinoma [de-identified] : NOLVIA [de-identified] : is feeling better but still not yet 100%. cont to have incisional pain and drainage. seeing surgery tomorrow. moving bowels regularly. eating better. energy improving. he clarified his hematological history. never was diagnosed with FA, has thalassemia minor. never needed transfusion.

## 2024-06-11 NOTE — PHYSICAL EXAM
[Fully active, able to carry on all pre-disease performance without restriction] : Status 0 - Fully active, able to carry on all pre-disease performance without restriction [Normal] : affect appropriate [de-identified] : normal respiratory pattern

## 2024-06-11 NOTE — REASON FOR VISIT
[Follow-Up Visit] : a follow-up [Home] : at home, [unfilled] , at the time of the visit. [Medical Office: ([facility])___] : at the medical office located in  [Patient] : the patient [Self] : self [FreeTextEntry2] : Stage III appendiceal cancer

## 2024-06-11 NOTE — HISTORY OF PRESENT ILLNESS
[Disease: _____________________] : Disease: [unfilled] [T: ___] : T[unfilled] [N: ___] : N[unfilled] [M: ___] : M[unfilled] [AJCC Stage: ____] : AJCC Stage: [unfilled] [Therapy: ___] : Therapy: [unfilled] [Cycle: ___] : Cycle: [unfilled] [Day: ___] : Day: [unfilled] [de-identified] : 53 M h/o thalassemi minor presents for adjuvant treatment of resected Stage III appendiceal adenocarcinoma.   Seb reports experiencing intermittent RLQ pain for several years especially after eating. He underwent a CT A/P on 2/26/24 performed at outside radiology facility which showed a soft tissue masslike thickening at the base of a dilated retrocecal appendix. Findings were suggestive of appendicitis but an underlying mass could not be ruled out. Pt had underwent his first colonoscopy on 3/25/24 which showed a fungating nonobstructive mass found at the hepatic flexure, at the appendiceal orifice. Pathology was highly suspicious for adenocarcinoma. CT Chest was negative for mets. On 4/18/24, pt underwent an open R hemicolectomy and en bloc resection of abdominal wall. Final path revealed a T4bN1b mod-poorly differentiated adenocarcinoma, 2/32 LN +, LV+ and PNI +, negative margins. MMR pending. Post operative course unremarkable and pt was d/c home on 4/20/24.   Referred to medical oncology for further management.   6/10/24: C1 XELOX   [de-identified] : moderately to poorly differentiated adenocarcinoma [de-identified] : NOLVIA [de-identified] : surgical wound has closed up. otherwise feeling well. today starts Xelox. took am dose of xeloda.  0

## 2024-06-11 NOTE — REVIEW OF SYSTEMS
[Diarrhea: Grade 0] : Diarrhea: Grade 0 [FreeTextEntry7] : + abdominal stiffness  [Negative] : Gastrointestinal

## 2024-06-25 ENCOUNTER — APPOINTMENT (OUTPATIENT)
Dept: HEMATOLOGY ONCOLOGY | Facility: CLINIC | Age: 54
End: 2024-06-25
Payer: COMMERCIAL

## 2024-06-25 ENCOUNTER — RESULT REVIEW (OUTPATIENT)
Age: 54
End: 2024-06-25

## 2024-06-25 VITALS
BODY MASS INDEX: 27.2 KG/M2 | OXYGEN SATURATION: 98 % | SYSTOLIC BLOOD PRESSURE: 120 MMHG | RESPIRATION RATE: 16 BRPM | DIASTOLIC BLOOD PRESSURE: 80 MMHG | WEIGHT: 178.9 LBS | HEART RATE: 73 BPM | TEMPERATURE: 97.2 F

## 2024-06-25 LAB
BASOPHILS # BLD AUTO: 0.02 K/UL — SIGNIFICANT CHANGE UP (ref 0–0.2)
BASOPHILS NFR BLD AUTO: 0.3 % — SIGNIFICANT CHANGE UP (ref 0–2)
EOSINOPHIL # BLD AUTO: 0.17 K/UL — SIGNIFICANT CHANGE UP (ref 0–0.5)
EOSINOPHIL NFR BLD AUTO: 2.5 % — SIGNIFICANT CHANGE UP (ref 0–6)
HCT VFR BLD CALC: 35.5 % — LOW (ref 39–50)
HGB BLD-MCNC: 11.2 G/DL — LOW (ref 13–17)
IMM GRANULOCYTES NFR BLD AUTO: 0.1 % — SIGNIFICANT CHANGE UP (ref 0–0.9)
LYMPHOCYTES # BLD AUTO: 2.16 K/UL — SIGNIFICANT CHANGE UP (ref 1–3.3)
LYMPHOCYTES # BLD AUTO: 32.3 % — SIGNIFICANT CHANGE UP (ref 13–44)
MCHC RBC-ENTMCNC: 20.3 PG — LOW (ref 27–34)
MCHC RBC-ENTMCNC: 31.5 G/DL — LOW (ref 32–36)
MCV RBC AUTO: 64.3 FL — LOW (ref 80–100)
MONOCYTES # BLD AUTO: 0.9 K/UL — SIGNIFICANT CHANGE UP (ref 0–0.9)
MONOCYTES NFR BLD AUTO: 13.5 % — SIGNIFICANT CHANGE UP (ref 2–14)
NEUTROPHILS # BLD AUTO: 3.43 K/UL — SIGNIFICANT CHANGE UP (ref 1.8–7.4)
NEUTROPHILS NFR BLD AUTO: 51.3 % — SIGNIFICANT CHANGE UP (ref 43–77)
NRBC # BLD: 0 /100 WBCS — SIGNIFICANT CHANGE UP (ref 0–0)
NRBC BLD-RTO: 0 /100 WBCS — SIGNIFICANT CHANGE UP (ref 0–0)
PLATELET # BLD AUTO: 253 K/UL — SIGNIFICANT CHANGE UP (ref 150–400)
RBC # BLD: 5.52 M/UL — SIGNIFICANT CHANGE UP (ref 4.2–5.8)
RBC # FLD: 16.8 % — HIGH (ref 10.3–14.5)
WBC # BLD: 6.69 K/UL — SIGNIFICANT CHANGE UP (ref 3.8–10.5)
WBC # FLD AUTO: 6.69 K/UL — SIGNIFICANT CHANGE UP (ref 3.8–10.5)

## 2024-06-25 PROCEDURE — 99214 OFFICE O/P EST MOD 30 MIN: CPT

## 2024-06-26 LAB
ALBUMIN SERPL ELPH-MCNC: 4.5 G/DL
ALP BLD-CCNC: 93 U/L
ALT SERPL-CCNC: 68 U/L
ANION GAP SERPL CALC-SCNC: 11 MMOL/L
AST SERPL-CCNC: 63 U/L
BILIRUB SERPL-MCNC: 0.6 MG/DL
BUN SERPL-MCNC: 15 MG/DL
CALCIUM SERPL-MCNC: 9.5 MG/DL
CHLORIDE SERPL-SCNC: 103 MMOL/L
CO2 SERPL-SCNC: 25 MMOL/L
CREAT SERPL-MCNC: 1.09 MG/DL
EGFR: 82 ML/MIN/1.73M2
GLUCOSE SERPL-MCNC: 105 MG/DL
POTASSIUM SERPL-SCNC: 4.3 MMOL/L
PROT SERPL-MCNC: 8.2 G/DL
SODIUM SERPL-SCNC: 138 MMOL/L

## 2024-06-27 DIAGNOSIS — C18.1 MALIGNANT NEOPLASM OF APPENDIX: ICD-10-CM

## 2024-07-01 ENCOUNTER — APPOINTMENT (OUTPATIENT)
Dept: INFUSION THERAPY | Facility: HOSPITAL | Age: 54
End: 2024-07-01

## 2024-07-01 ENCOUNTER — RESULT REVIEW (OUTPATIENT)
Age: 54
End: 2024-07-01

## 2024-07-02 LAB
ALBUMIN SERPL ELPH-MCNC: 4.4 G/DL — SIGNIFICANT CHANGE UP (ref 3.3–5)
ALP SERPL-CCNC: 92 U/L — SIGNIFICANT CHANGE UP (ref 40–120)
ALT FLD-CCNC: 75 U/L — HIGH (ref 10–45)
ANION GAP SERPL CALC-SCNC: 13 MMOL/L — SIGNIFICANT CHANGE UP (ref 5–17)
AST SERPL-CCNC: 64 U/L — HIGH (ref 10–40)
BILIRUB SERPL-MCNC: 0.6 MG/DL — SIGNIFICANT CHANGE UP (ref 0.2–1.2)
BUN SERPL-MCNC: 16 MG/DL — SIGNIFICANT CHANGE UP (ref 7–23)
CALCIUM SERPL-MCNC: 9.8 MG/DL — SIGNIFICANT CHANGE UP (ref 8.4–10.5)
CHLORIDE SERPL-SCNC: 102 MMOL/L — SIGNIFICANT CHANGE UP (ref 96–108)
CO2 SERPL-SCNC: 25 MMOL/L — SIGNIFICANT CHANGE UP (ref 22–31)
CREAT SERPL-MCNC: 1.02 MG/DL — SIGNIFICANT CHANGE UP (ref 0.5–1.3)
EGFR: 89 ML/MIN/1.73M2 — SIGNIFICANT CHANGE UP
EGFR: 89 ML/MIN/1.73M2 — SIGNIFICANT CHANGE UP
GLUCOSE SERPL-MCNC: 74 MG/DL — SIGNIFICANT CHANGE UP (ref 70–99)
POTASSIUM SERPL-MCNC: 4.3 MMOL/L — SIGNIFICANT CHANGE UP (ref 3.5–5.3)
POTASSIUM SERPL-SCNC: 4.3 MMOL/L — SIGNIFICANT CHANGE UP (ref 3.5–5.3)
PROT SERPL-MCNC: 8.5 G/DL — HIGH (ref 6–8.3)
SODIUM SERPL-SCNC: 140 MMOL/L — SIGNIFICANT CHANGE UP (ref 135–145)

## 2024-07-12 ENCOUNTER — OUTPATIENT (OUTPATIENT)
Dept: OUTPATIENT SERVICES | Facility: HOSPITAL | Age: 54
LOS: 1 days | Discharge: HOME | End: 2024-07-12

## 2024-07-12 DIAGNOSIS — C18.9 MALIGNANT NEOPLASM OF COLON, UNSPECIFIED: ICD-10-CM

## 2024-07-15 ENCOUNTER — NON-APPOINTMENT (OUTPATIENT)
Age: 54
End: 2024-07-15

## 2024-07-16 ENCOUNTER — RESULT REVIEW (OUTPATIENT)
Age: 54
End: 2024-07-16

## 2024-07-16 ENCOUNTER — APPOINTMENT (OUTPATIENT)
Dept: HEMATOLOGY ONCOLOGY | Facility: CLINIC | Age: 54
End: 2024-07-16
Payer: COMMERCIAL

## 2024-07-16 VITALS
BODY MASS INDEX: 26.98 KG/M2 | DIASTOLIC BLOOD PRESSURE: 81 MMHG | TEMPERATURE: 97.2 F | SYSTOLIC BLOOD PRESSURE: 117 MMHG | WEIGHT: 177.47 LBS | OXYGEN SATURATION: 98 % | RESPIRATION RATE: 16 BRPM | HEART RATE: 80 BPM

## 2024-07-16 DIAGNOSIS — C18.1 MALIGNANT NEOPLASM OF APPENDIX: ICD-10-CM

## 2024-07-16 LAB
ALBUMIN SERPL ELPH-MCNC: 4.4 G/DL
ALP BLD-CCNC: 90 U/L
ALT SERPL-CCNC: 44 U/L
ANION GAP SERPL CALC-SCNC: 14 MMOL/L
AST SERPL-CCNC: 52 U/L
BASOPHILS # BLD AUTO: 0.02 K/UL — SIGNIFICANT CHANGE UP (ref 0–0.2)
BASOPHILS NFR BLD AUTO: 0.3 % — SIGNIFICANT CHANGE UP (ref 0–2)
BILIRUB SERPL-MCNC: 0.4 MG/DL
BUN SERPL-MCNC: 16 MG/DL
CALCIUM SERPL-MCNC: 9.3 MG/DL
CHLORIDE SERPL-SCNC: 101 MMOL/L
CO2 SERPL-SCNC: 21 MMOL/L
CREAT SERPL-MCNC: 0.93 MG/DL
EGFR: 99 ML/MIN/1.73M2
EOSINOPHIL # BLD AUTO: 0.13 K/UL — SIGNIFICANT CHANGE UP (ref 0–0.5)
EOSINOPHIL NFR BLD AUTO: 1.7 % — SIGNIFICANT CHANGE UP (ref 0–6)
GLUCOSE SERPL-MCNC: 102 MG/DL
HCT VFR BLD CALC: 33.3 % — LOW (ref 39–50)
HGB BLD-MCNC: 10.8 G/DL — LOW (ref 13–17)
IMM GRANULOCYTES NFR BLD AUTO: 0.4 % — SIGNIFICANT CHANGE UP (ref 0–0.9)
LYMPHOCYTES # BLD AUTO: 1.79 K/UL — SIGNIFICANT CHANGE UP (ref 1–3.3)
LYMPHOCYTES # BLD AUTO: 24 % — SIGNIFICANT CHANGE UP (ref 13–44)
MCHC RBC-ENTMCNC: 20.7 PG — LOW (ref 27–34)
MCHC RBC-ENTMCNC: 32.4 G/DL — SIGNIFICANT CHANGE UP (ref 32–36)
MCV RBC AUTO: 63.9 FL — LOW (ref 80–100)
MONOCYTES # BLD AUTO: 1.02 K/UL — HIGH (ref 0–0.9)
MONOCYTES NFR BLD AUTO: 13.7 % — SIGNIFICANT CHANGE UP (ref 2–14)
NEUTROPHILS # BLD AUTO: 4.46 K/UL — SIGNIFICANT CHANGE UP (ref 1.8–7.4)
NEUTROPHILS NFR BLD AUTO: 59.9 % — SIGNIFICANT CHANGE UP (ref 43–77)
NRBC # BLD: 0 /100 WBCS — SIGNIFICANT CHANGE UP (ref 0–0)
PLATELET # BLD AUTO: 174 K/UL — SIGNIFICANT CHANGE UP (ref 150–400)
POTASSIUM SERPL-SCNC: 4.1 MMOL/L
PROT SERPL-MCNC: 8.1 G/DL
RBC # BLD: 5.21 M/UL — SIGNIFICANT CHANGE UP (ref 4.2–5.8)
RBC # FLD: 18 % — HIGH (ref 10.3–14.5)
SODIUM SERPL-SCNC: 136 MMOL/L
WBC # BLD: 7.45 K/UL — SIGNIFICANT CHANGE UP (ref 3.8–10.5)
WBC # FLD AUTO: 7.45 K/UL — SIGNIFICANT CHANGE UP (ref 3.8–10.5)

## 2024-07-16 PROCEDURE — 99214 OFFICE O/P EST MOD 30 MIN: CPT

## 2024-07-22 ENCOUNTER — RESULT REVIEW (OUTPATIENT)
Age: 54
End: 2024-07-22

## 2024-07-22 ENCOUNTER — NON-APPOINTMENT (OUTPATIENT)
Age: 54
End: 2024-07-22

## 2024-07-22 ENCOUNTER — APPOINTMENT (OUTPATIENT)
Dept: INFUSION THERAPY | Facility: HOSPITAL | Age: 54
End: 2024-07-22

## 2024-07-22 DIAGNOSIS — Z51.11 ENCOUNTER FOR ANTINEOPLASTIC CHEMOTHERAPY: ICD-10-CM

## 2024-07-22 DIAGNOSIS — R11.2 NAUSEA WITH VOMITING, UNSPECIFIED: ICD-10-CM

## 2024-07-22 LAB
ALBUMIN SERPL ELPH-MCNC: 4 G/DL — SIGNIFICANT CHANGE UP (ref 3.3–5)
ALP SERPL-CCNC: 93 U/L — SIGNIFICANT CHANGE UP (ref 40–120)
ALT FLD-CCNC: 34 U/L — SIGNIFICANT CHANGE UP (ref 10–45)
ANION GAP SERPL CALC-SCNC: 13 MMOL/L — SIGNIFICANT CHANGE UP (ref 5–17)
AST SERPL-CCNC: 44 U/L — HIGH (ref 10–40)
BILIRUB SERPL-MCNC: 0.4 MG/DL — SIGNIFICANT CHANGE UP (ref 0.2–1.2)
BUN SERPL-MCNC: 15 MG/DL — SIGNIFICANT CHANGE UP (ref 7–23)
CALCIUM SERPL-MCNC: 9.3 MG/DL — SIGNIFICANT CHANGE UP (ref 8.4–10.5)
CHLORIDE SERPL-SCNC: 105 MMOL/L — SIGNIFICANT CHANGE UP (ref 96–108)
CO2 SERPL-SCNC: 21 MMOL/L — LOW (ref 22–31)
CREAT SERPL-MCNC: 0.93 MG/DL — SIGNIFICANT CHANGE UP (ref 0.5–1.3)
EGFR: 99 ML/MIN/1.73M2 — SIGNIFICANT CHANGE UP
GLUCOSE SERPL-MCNC: 96 MG/DL — SIGNIFICANT CHANGE UP (ref 70–99)
POTASSIUM SERPL-MCNC: 3.9 MMOL/L — SIGNIFICANT CHANGE UP (ref 3.5–5.3)
POTASSIUM SERPL-SCNC: 3.9 MMOL/L — SIGNIFICANT CHANGE UP (ref 3.5–5.3)
PROT SERPL-MCNC: 7.6 G/DL — SIGNIFICANT CHANGE UP (ref 6–8.3)
SODIUM SERPL-SCNC: 139 MMOL/L — SIGNIFICANT CHANGE UP (ref 135–145)

## 2024-08-09 ENCOUNTER — APPOINTMENT (OUTPATIENT)
Dept: HEMATOLOGY ONCOLOGY | Facility: CLINIC | Age: 54
End: 2024-08-09

## 2024-08-09 ENCOUNTER — RESULT REVIEW (OUTPATIENT)
Age: 54
End: 2024-08-09

## 2024-08-09 LAB
BASOPHILS # BLD AUTO: 0.03 K/UL — SIGNIFICANT CHANGE UP (ref 0–0.2)
BASOPHILS NFR BLD AUTO: 0.5 % — SIGNIFICANT CHANGE UP (ref 0–2)
EOSINOPHIL # BLD AUTO: 0.16 K/UL — SIGNIFICANT CHANGE UP (ref 0–0.5)
EOSINOPHIL NFR BLD AUTO: 2.8 % — SIGNIFICANT CHANGE UP (ref 0–6)
HCT VFR BLD CALC: 31.6 % — LOW (ref 39–50)
HGB BLD-MCNC: 10.2 G/DL — LOW (ref 13–17)
IMM GRANULOCYTES NFR BLD AUTO: 0.5 % — SIGNIFICANT CHANGE UP (ref 0–0.9)
LYMPHOCYTES # BLD AUTO: 1.92 K/UL — SIGNIFICANT CHANGE UP (ref 1–3.3)
LYMPHOCYTES # BLD AUTO: 34 % — SIGNIFICANT CHANGE UP (ref 13–44)
MCHC RBC-ENTMCNC: 21.1 PG — LOW (ref 27–34)
MCHC RBC-ENTMCNC: 32.3 G/DL — SIGNIFICANT CHANGE UP (ref 32–36)
MCV RBC AUTO: 65.4 FL — LOW (ref 80–100)
MONOCYTES # BLD AUTO: 0.84 K/UL — SIGNIFICANT CHANGE UP (ref 0–0.9)
MONOCYTES NFR BLD AUTO: 14.9 % — HIGH (ref 2–14)
NEUTROPHILS # BLD AUTO: 2.67 K/UL — SIGNIFICANT CHANGE UP (ref 1.8–7.4)
NEUTROPHILS NFR BLD AUTO: 47.3 % — SIGNIFICANT CHANGE UP (ref 43–77)
NRBC # BLD: 0 /100 WBCS — SIGNIFICANT CHANGE UP (ref 0–0)
PLATELET # BLD AUTO: 194 K/UL — SIGNIFICANT CHANGE UP (ref 150–400)
RBC # BLD: 4.83 M/UL — SIGNIFICANT CHANGE UP (ref 4.2–5.8)
RBC # FLD: 20.7 % — HIGH (ref 10.3–14.5)
WBC # BLD: 5.65 K/UL — SIGNIFICANT CHANGE UP (ref 3.8–10.5)
WBC # FLD AUTO: 5.65 K/UL — SIGNIFICANT CHANGE UP (ref 3.8–10.5)

## 2024-08-09 PROCEDURE — 99214 OFFICE O/P EST MOD 30 MIN: CPT

## 2024-08-09 PROCEDURE — G2211 COMPLEX E/M VISIT ADD ON: CPT | Mod: NC

## 2024-08-09 NOTE — REVIEW OF SYSTEMS
[Fatigue] : fatigue [Negative] : Allergic/Immunologic [Diarrhea: Grade 1 - Increase of <4 stools per day over baseline; mild increase in ostomy output compared to baseline] : Diarrhea: Grade 1 - Increase of <4 stools per day over baseline; mild increase in ostomy output compared to baseline [de-identified] : +cold sensitivity

## 2024-08-09 NOTE — HISTORY OF PRESENT ILLNESS
[Disease: _____________________] : Disease: [unfilled] [T: ___] : T[unfilled] [N: ___] : N[unfilled] [M: ___] : M[unfilled] [AJCC Stage: ____] : AJCC Stage: [unfilled] [Therapy: ___] : Therapy: [unfilled] [Cycle: ___] : Cycle: [unfilled] [Day: ___] : Day: [unfilled] [de-identified] : 53 M h/o thalassemi minor presents for adjuvant treatment of resected Stage III appendiceal adenocarcinoma.   Seb reports experiencing intermittent RLQ pain for several years especially after eating. He underwent a CT A/P on 2/26/24 performed at outside radiology facility which showed a soft tissue masslike thickening at the base of a dilated retrocecal appendix. Findings were suggestive of appendicitis but an underlying mass could not be ruled out. Pt had underwent his first colonoscopy on 3/25/24 which showed a fungating nonobstructive mass found at the hepatic flexure, at the appendiceal orifice. Pathology was highly suspicious for adenocarcinoma. CT Chest was negative for mets. On 4/18/24, pt underwent an open R hemicolectomy and en bloc resection of abdominal wall. Final path revealed a T4bN1b mod-poorly differentiated adenocarcinoma, 2/32 LN +, LV+ and PNI +, negative margins. MMR pending. Post operative course unremarkable and pt was d/c home on 4/20/24.   Referred to medical oncology for further management.   5/24/24: CT CAP: SALIMA 6/10/24: C1 XELOX 7/1/24: C2 XELOX  7/22/24: C3  [de-identified] : moderately to poorly differentiated adenocarcinoma [de-identified] : NOLVIA [de-identified] : Here for clinical follow up Tolerated C3 less well  C/o loss of taste  C/o increased neuropathy with cold sensitivity. Longer lasting. Feels pain with drinking anything cold or reaching into the refrigerator.  +diarrhea. Does not want to take imodium

## 2024-08-09 NOTE — REVIEW OF SYSTEMS
[Fatigue] : fatigue [Negative] : Allergic/Immunologic [Diarrhea: Grade 1 - Increase of <4 stools per day over baseline; mild increase in ostomy output compared to baseline] : Diarrhea: Grade 1 - Increase of <4 stools per day over baseline; mild increase in ostomy output compared to baseline [de-identified] : +cold sensitivity

## 2024-08-09 NOTE — PHYSICAL EXAM
[Fully active, able to carry on all pre-disease performance without restriction] : Status 0 - Fully active, able to carry on all pre-disease performance without restriction [Normal] : affect appropriate [de-identified] : surgical scar CDI

## 2024-08-09 NOTE — ASSESSMENT
[Future Reassessment of Pain Scale] : Future reassessment of pain scale    [Curative] : Goals of care discussed with patient: Curative [Palliative Care Plan] : not applicable at this time [FreeTextEntry1] : Patient seen and discussed with Dr. Yasmine Harrison.

## 2024-08-09 NOTE — PHYSICAL EXAM
[Fully active, able to carry on all pre-disease performance without restriction] : Status 0 - Fully active, able to carry on all pre-disease performance without restriction [Normal] : affect appropriate [de-identified] : surgical scar CDI

## 2024-08-09 NOTE — HISTORY OF PRESENT ILLNESS
[Disease: _____________________] : Disease: [unfilled] [T: ___] : T[unfilled] [N: ___] : N[unfilled] [M: ___] : M[unfilled] [AJCC Stage: ____] : AJCC Stage: [unfilled] [Therapy: ___] : Therapy: [unfilled] [Cycle: ___] : Cycle: [unfilled] [Day: ___] : Day: [unfilled] [de-identified] : 53 M h/o thalassemi minor presents for adjuvant treatment of resected Stage III appendiceal adenocarcinoma.   Seb reports experiencing intermittent RLQ pain for several years especially after eating. He underwent a CT A/P on 2/26/24 performed at outside radiology facility which showed a soft tissue masslike thickening at the base of a dilated retrocecal appendix. Findings were suggestive of appendicitis but an underlying mass could not be ruled out. Pt had underwent his first colonoscopy on 3/25/24 which showed a fungating nonobstructive mass found at the hepatic flexure, at the appendiceal orifice. Pathology was highly suspicious for adenocarcinoma. CT Chest was negative for mets. On 4/18/24, pt underwent an open R hemicolectomy and en bloc resection of abdominal wall. Final path revealed a T4bN1b mod-poorly differentiated adenocarcinoma, 2/32 LN +, LV+ and PNI +, negative margins. MMR pending. Post operative course unremarkable and pt was d/c home on 4/20/24.   Referred to medical oncology for further management.   5/24/24: CT CAP: SALIMA 6/10/24: C1 XELOX 7/1/24: C2 XELOX  7/22/24: C3  [de-identified] : moderately to poorly differentiated adenocarcinoma [de-identified] : NOLVIA [de-identified] : Here for clinical follow up Tolerated C3 less well  C/o loss of taste  C/o increased neuropathy with cold sensitivity. Longer lasting. Feels pain with drinking anything cold or reaching into the refrigerator.  +diarrhea. Does not want to take imodium

## 2024-08-12 ENCOUNTER — RESULT REVIEW (OUTPATIENT)
Age: 54
End: 2024-08-12

## 2024-08-12 ENCOUNTER — APPOINTMENT (OUTPATIENT)
Dept: INFUSION THERAPY | Facility: HOSPITAL | Age: 54
End: 2024-08-12

## 2024-08-12 LAB
ALBUMIN SERPL ELPH-MCNC: 4.1 G/DL — SIGNIFICANT CHANGE UP (ref 3.3–5)
ALP SERPL-CCNC: 99 U/L — SIGNIFICANT CHANGE UP (ref 40–120)
ALT FLD-CCNC: 33 U/L — SIGNIFICANT CHANGE UP (ref 10–45)
ANION GAP SERPL CALC-SCNC: 11 MMOL/L — SIGNIFICANT CHANGE UP (ref 5–17)
AST SERPL-CCNC: 53 U/L — HIGH (ref 10–40)
BILIRUB SERPL-MCNC: 0.6 MG/DL — SIGNIFICANT CHANGE UP (ref 0.2–1.2)
BUN SERPL-MCNC: 15 MG/DL — SIGNIFICANT CHANGE UP (ref 7–23)
CALCIUM SERPL-MCNC: 9.8 MG/DL — SIGNIFICANT CHANGE UP (ref 8.4–10.5)
CHLORIDE SERPL-SCNC: 101 MMOL/L — SIGNIFICANT CHANGE UP (ref 96–108)
CO2 SERPL-SCNC: 27 MMOL/L — SIGNIFICANT CHANGE UP (ref 22–31)
CREAT SERPL-MCNC: 0.94 MG/DL — SIGNIFICANT CHANGE UP (ref 0.5–1.3)
EGFR: 98 ML/MIN/1.73M2 — SIGNIFICANT CHANGE UP
GLUCOSE SERPL-MCNC: 93 MG/DL — SIGNIFICANT CHANGE UP (ref 70–99)
POTASSIUM SERPL-MCNC: 4 MMOL/L — SIGNIFICANT CHANGE UP (ref 3.5–5.3)
POTASSIUM SERPL-SCNC: 4 MMOL/L — SIGNIFICANT CHANGE UP (ref 3.5–5.3)
PROT SERPL-MCNC: 8.1 G/DL — SIGNIFICANT CHANGE UP (ref 6–8.3)
SODIUM SERPL-SCNC: 139 MMOL/L — SIGNIFICANT CHANGE UP (ref 135–145)

## 2024-08-30 ENCOUNTER — APPOINTMENT (OUTPATIENT)
Dept: HEMATOLOGY ONCOLOGY | Facility: CLINIC | Age: 54
End: 2024-08-30
Payer: COMMERCIAL

## 2024-08-30 ENCOUNTER — RESULT REVIEW (OUTPATIENT)
Age: 54
End: 2024-08-30

## 2024-08-30 VITALS
DIASTOLIC BLOOD PRESSURE: 81 MMHG | SYSTOLIC BLOOD PRESSURE: 117 MMHG | OXYGEN SATURATION: 98 % | WEIGHT: 176.59 LBS | RESPIRATION RATE: 16 BRPM | HEART RATE: 79 BPM | BODY MASS INDEX: 26.85 KG/M2 | TEMPERATURE: 97 F

## 2024-08-30 DIAGNOSIS — C18.1 MALIGNANT NEOPLASM OF APPENDIX: ICD-10-CM

## 2024-08-30 LAB
ALBUMIN SERPL ELPH-MCNC: 4.5 G/DL
ALP BLD-CCNC: 127 U/L
ALT SERPL-CCNC: 38 U/L
ANION GAP SERPL CALC-SCNC: 12 MMOL/L
ANISOCYTOSIS BLD QL: SLIGHT — SIGNIFICANT CHANGE UP
AST SERPL-CCNC: 59 U/L
BASO STIPL BLD QL SMEAR: PRESENT — SIGNIFICANT CHANGE UP
BASOPHILS # BLD AUTO: 0.02 K/UL — SIGNIFICANT CHANGE UP (ref 0–0.2)
BASOPHILS NFR BLD AUTO: 0.4 % — SIGNIFICANT CHANGE UP (ref 0–2)
BILIRUB SERPL-MCNC: 0.8 MG/DL
BUN SERPL-MCNC: 14 MG/DL
CALCIUM SERPL-MCNC: 9.5 MG/DL
CHLORIDE SERPL-SCNC: 102 MMOL/L
CO2 SERPL-SCNC: 24 MMOL/L
CREAT SERPL-MCNC: 0.91 MG/DL
DACRYOCYTES BLD QL SMEAR: SLIGHT — SIGNIFICANT CHANGE UP
EGFR: 102 ML/MIN/1.73M2
ELLIPTOCYTES BLD QL SMEAR: SLIGHT — SIGNIFICANT CHANGE UP
EOSINOPHIL # BLD AUTO: 0.09 K/UL — SIGNIFICANT CHANGE UP (ref 0–0.5)
EOSINOPHIL NFR BLD AUTO: 1.9 % — SIGNIFICANT CHANGE UP (ref 0–6)
GLUCOSE SERPL-MCNC: 106 MG/DL
HCT VFR BLD CALC: 33.3 % — LOW (ref 39–50)
HGB BLD-MCNC: 10.7 G/DL — LOW (ref 13–17)
IMM GRANULOCYTES NFR BLD AUTO: 0.2 % — SIGNIFICANT CHANGE UP (ref 0–0.9)
LYMPHOCYTES # BLD AUTO: 1.59 K/UL — SIGNIFICANT CHANGE UP (ref 1–3.3)
LYMPHOCYTES # BLD AUTO: 32.8 % — SIGNIFICANT CHANGE UP (ref 13–44)
MCHC RBC-ENTMCNC: 21.8 PG — LOW (ref 27–34)
MCHC RBC-ENTMCNC: 32.1 G/DL — SIGNIFICANT CHANGE UP (ref 32–36)
MCV RBC AUTO: 67.8 FL — LOW (ref 80–100)
MONOCYTES # BLD AUTO: 0.84 K/UL — SIGNIFICANT CHANGE UP (ref 0–0.9)
MONOCYTES NFR BLD AUTO: 17.3 % — HIGH (ref 2–14)
NEUTROPHILS # BLD AUTO: 2.3 K/UL — SIGNIFICANT CHANGE UP (ref 1.8–7.4)
NEUTROPHILS NFR BLD AUTO: 47.4 % — SIGNIFICANT CHANGE UP (ref 43–77)
NRBC # BLD: 0 /100 WBCS — SIGNIFICANT CHANGE UP (ref 0–0)
PLAT MORPH BLD: NORMAL — SIGNIFICANT CHANGE UP
PLATELET # BLD AUTO: 204 K/UL — SIGNIFICANT CHANGE UP (ref 150–400)
POIKILOCYTOSIS BLD QL AUTO: SLIGHT — SIGNIFICANT CHANGE UP
POTASSIUM SERPL-SCNC: 4.8 MMOL/L
PROT SERPL-MCNC: 8.1 G/DL
RBC # BLD: 4.91 M/UL — SIGNIFICANT CHANGE UP (ref 4.2–5.8)
RBC # FLD: 22.1 % — HIGH (ref 10.3–14.5)
RBC BLD AUTO: ABNORMAL
SODIUM SERPL-SCNC: 138 MMOL/L
TARGETS BLD QL SMEAR: SLIGHT — SIGNIFICANT CHANGE UP
WBC # BLD: 4.85 K/UL — SIGNIFICANT CHANGE UP (ref 3.8–10.5)
WBC # FLD AUTO: 4.85 K/UL — SIGNIFICANT CHANGE UP (ref 3.8–10.5)

## 2024-08-30 PROCEDURE — 99214 OFFICE O/P EST MOD 30 MIN: CPT

## 2024-08-30 RX ORDER — BETAMETHASONE DIPROPIONATE 0.5 MG/G
0.05 CREAM TOPICAL TWICE DAILY
Qty: 1 | Refills: 0 | Status: ACTIVE | COMMUNITY
Start: 2024-08-30 | End: 1900-01-01

## 2024-08-30 NOTE — PHYSICAL EXAM
[Fully active, able to carry on all pre-disease performance without restriction] : Status 0 - Fully active, able to carry on all pre-disease performance without restriction [Mucositis] : mucositis [Normal] : affect appropriate [de-identified] : surgical scar CDI [de-identified] : erythema and desquamation bottom of his feet

## 2024-08-30 NOTE — REVIEW OF SYSTEMS
[Fatigue] : fatigue [Mucosal Pain] : mucosal pain [Diarrhea: Grade 1 - Increase of <4 stools per day over baseline; mild increase in ostomy output compared to baseline] : Diarrhea: Grade 1 - Increase of <4 stools per day over baseline; mild increase in ostomy output compared to baseline [Negative] : Allergic/Immunologic [FreeTextEntry4] : loss of taste  [de-identified] : +cold sensitivity

## 2024-08-30 NOTE — HISTORY OF PRESENT ILLNESS
[Disease: _____________________] : Disease: [unfilled] [T: ___] : T[unfilled] [N: ___] : N[unfilled] [M: ___] : M[unfilled] [AJCC Stage: ____] : AJCC Stage: [unfilled] [Therapy: ___] : Therapy: [unfilled] [Cycle: ___] : Cycle: [unfilled] [Day: ___] : Day: [unfilled] [de-identified] : 53 M h/o thalassemi minor presents for adjuvant treatment of resected Stage III appendiceal adenocarcinoma.   Seb reports experiencing intermittent RLQ pain for several years especially after eating. He underwent a CT A/P on 2/26/24 performed at outside radiology facility which showed a soft tissue masslike thickening at the base of a dilated retrocecal appendix. Findings were suggestive of appendicitis but an underlying mass could not be ruled out. Pt had underwent his first colonoscopy on 3/25/24 which showed a fungating nonobstructive mass found at the hepatic flexure, at the appendiceal orifice. Pathology was highly suspicious for adenocarcinoma. CT Chest was negative for mets. On 4/18/24, pt underwent an open R hemicolectomy and en bloc resection of abdominal wall. Final path revealed a T4bN1b mod-poorly differentiated adenocarcinoma, 2/32 LN +, LV+ and PNI +, negative margins. MMR pending. Post operative course unremarkable and pt was d/c home on 4/20/24.   Referred to medical oncology for further management.   5/24/24: CT CAP: SALIMA 6/10/24: C1 XELOX 7/1/24: C2 XELOX  7/22/24: C3  8/12/24: C4 [de-identified] : moderately to poorly differentiated adenocarcinoma [de-identified] : NOLVIA [de-identified] : Here for clinical follow up S/p C4 FOLFOX  C/o fatigue requiring 3 hours naps during this cycle.  ongoing loss of taste + a few mouth sores +diarrhea 3-4 BMs per day. Still not taking imodium  Neuropathy still present and lingering w/ cold sensitivity in his hands and feet. C/o trouble hearing intermittently and intermittent blurry vision  Feet hurt + skin peeled. Has been using udderly smooth but not using voltaren regularly

## 2024-09-03 ENCOUNTER — APPOINTMENT (OUTPATIENT)
Dept: INFUSION THERAPY | Facility: HOSPITAL | Age: 54
End: 2024-09-03

## 2024-09-10 ENCOUNTER — OUTPATIENT (OUTPATIENT)
Dept: OUTPATIENT SERVICES | Facility: HOSPITAL | Age: 54
LOS: 1 days | Discharge: HOME | End: 2024-09-10

## 2024-09-10 DIAGNOSIS — Z98.890 OTHER SPECIFIED POSTPROCEDURAL STATES: Chronic | ICD-10-CM

## 2024-09-10 DIAGNOSIS — C18.9 MALIGNANT NEOPLASM OF COLON, UNSPECIFIED: ICD-10-CM

## 2024-09-17 ENCOUNTER — RESULT REVIEW (OUTPATIENT)
Age: 54
End: 2024-09-17

## 2024-09-17 ENCOUNTER — APPOINTMENT (OUTPATIENT)
Dept: HEMATOLOGY ONCOLOGY | Facility: CLINIC | Age: 54
End: 2024-09-17
Payer: COMMERCIAL

## 2024-09-17 VITALS
DIASTOLIC BLOOD PRESSURE: 81 MMHG | BODY MASS INDEX: 27.49 KG/M2 | OXYGEN SATURATION: 99 % | TEMPERATURE: 98.6 F | RESPIRATION RATE: 16 BRPM | HEART RATE: 81 BPM | SYSTOLIC BLOOD PRESSURE: 120 MMHG | WEIGHT: 180.78 LBS

## 2024-09-17 LAB
BASOPHILS # BLD AUTO: 0.03 K/UL — SIGNIFICANT CHANGE UP (ref 0–0.2)
BASOPHILS NFR BLD AUTO: 0.5 % — SIGNIFICANT CHANGE UP (ref 0–2)
EOSINOPHIL # BLD AUTO: 0.36 K/UL — SIGNIFICANT CHANGE UP (ref 0–0.5)
EOSINOPHIL NFR BLD AUTO: 5.5 % — SIGNIFICANT CHANGE UP (ref 0–6)
HCT VFR BLD CALC: 31.7 % — LOW (ref 39–50)
HGB BLD-MCNC: 10.4 G/DL — LOW (ref 13–17)
IMM GRANULOCYTES NFR BLD AUTO: 0.3 % — SIGNIFICANT CHANGE UP (ref 0–0.9)
LYMPHOCYTES # BLD AUTO: 2.42 K/UL — SIGNIFICANT CHANGE UP (ref 1–3.3)
LYMPHOCYTES # BLD AUTO: 36.9 % — SIGNIFICANT CHANGE UP (ref 13–44)
MCHC RBC-ENTMCNC: 23 PG — LOW (ref 27–34)
MCHC RBC-ENTMCNC: 32.8 G/DL — SIGNIFICANT CHANGE UP (ref 32–36)
MCV RBC AUTO: 70 FL — LOW (ref 80–100)
MONOCYTES # BLD AUTO: 0.67 K/UL — SIGNIFICANT CHANGE UP (ref 0–0.9)
MONOCYTES NFR BLD AUTO: 10.2 % — SIGNIFICANT CHANGE UP (ref 2–14)
NEUTROPHILS # BLD AUTO: 3.06 K/UL — SIGNIFICANT CHANGE UP (ref 1.8–7.4)
NEUTROPHILS NFR BLD AUTO: 46.6 % — SIGNIFICANT CHANGE UP (ref 43–77)
NRBC # BLD: 0 /100 WBCS — SIGNIFICANT CHANGE UP (ref 0–0)
PLATELET # BLD AUTO: 249 K/UL — SIGNIFICANT CHANGE UP (ref 150–400)
RBC # BLD: 4.53 M/UL — SIGNIFICANT CHANGE UP (ref 4.2–5.8)
RBC # FLD: 20.5 % — HIGH (ref 10.3–14.5)
WBC # BLD: 6.56 K/UL — SIGNIFICANT CHANGE UP (ref 3.8–10.5)
WBC # FLD AUTO: 6.56 K/UL — SIGNIFICANT CHANGE UP (ref 3.8–10.5)

## 2024-09-17 PROCEDURE — G2211 COMPLEX E/M VISIT ADD ON: CPT | Mod: NC

## 2024-09-17 PROCEDURE — 99214 OFFICE O/P EST MOD 30 MIN: CPT

## 2024-09-17 NOTE — HISTORY OF PRESENT ILLNESS
[Disease: _____________________] : Disease: [unfilled] [T: ___] : T[unfilled] [N: ___] : N[unfilled] [M: ___] : M[unfilled] [AJCC Stage: ____] : AJCC Stage: [unfilled] [Therapy: ___] : Therapy: [unfilled] [Cycle: ___] : Cycle: [unfilled] [Day: ___] : Day: [unfilled] [de-identified] : 53 M h/o thalassemi minor presents for adjuvant treatment of resected Stage III appendiceal adenocarcinoma.   Seb reports experiencing intermittent RLQ pain for several years especially after eating. He underwent a CT A/P on 2/26/24 performed at outside radiology facility which showed a soft tissue masslike thickening at the base of a dilated retrocecal appendix. Findings were suggestive of appendicitis but an underlying mass could not be ruled out. Pt had underwent his first colonoscopy on 3/25/24 which showed a fungating nonobstructive mass found at the hepatic flexure, at the appendiceal orifice. Pathology was highly suspicious for adenocarcinoma. CT Chest was negative for mets. On 4/18/24, pt underwent an open R hemicolectomy and en bloc resection of abdominal wall. Final path revealed a T4bN1b mod-poorly differentiated adenocarcinoma, 2/32 LN +, LV+ and PNI +, negative margins. MMR pending. Post operative course unremarkable and pt was d/c home on 4/20/24.   Referred to medical oncology for further management.   5/24/24: CT CAP: SALIMA 6/10/24: C1 XELOX 7/1/24: C2 XELOX  7/22/24: C3  8/12/24: C4 9/2/24: C5D1 Xeloda [de-identified] : moderately to poorly differentiated adenocarcinoma [de-identified] : NOLVIA [de-identified] : + HFS has improved, feet dry and red but able to walk. Using udderly smooth once per day, not using diclofenac due to bad smell of the cream. has not used betamethasone cream at all.  neuropathy in hands/feet has mostly resolved  fatigue improved with stopping Oxaliplatin  5 BMs  while on xeloda, does not

## 2024-09-17 NOTE — PHYSICAL EXAM
[Fully active, able to carry on all pre-disease performance without restriction] : Status 0 - Fully active, able to carry on all pre-disease performance without restriction [Mucositis] : mucositis [Normal] : affect appropriate [de-identified] : surgical scar CDI [de-identified] : erythema and desquamation bottom of his feet

## 2024-09-17 NOTE — REVIEW OF SYSTEMS
[Fatigue] : fatigue [Mucosal Pain] : mucosal pain [Diarrhea: Grade 1 - Increase of <4 stools per day over baseline; mild increase in ostomy output compared to baseline] : Diarrhea: Grade 1 - Increase of <4 stools per day over baseline; mild increase in ostomy output compared to baseline [Negative] : Allergic/Immunologic [FreeTextEntry4] : loss of taste  [de-identified] : +cold sensitivity

## 2024-09-19 DIAGNOSIS — C18.1 MALIGNANT NEOPLASM OF APPENDIX: ICD-10-CM

## 2024-09-19 LAB
ALBUMIN SERPL ELPH-MCNC: 4.4 G/DL
ALP BLD-CCNC: 95 U/L
ALT SERPL-CCNC: 24 U/L
ANION GAP SERPL CALC-SCNC: 12 MMOL/L
AST SERPL-CCNC: 35 U/L
BILIRUB SERPL-MCNC: 0.6 MG/DL
BUN SERPL-MCNC: 16 MG/DL
CALCIUM SERPL-MCNC: 9.4 MG/DL
CEA SERPL-MCNC: 1.6 NG/ML
CHLORIDE SERPL-SCNC: 102 MMOL/L
CO2 SERPL-SCNC: 25 MMOL/L
CREAT SERPL-MCNC: 1 MG/DL
EGFR: 91 ML/MIN/1.73M2
GLUCOSE SERPL-MCNC: 93 MG/DL
POTASSIUM SERPL-SCNC: 4.6 MMOL/L
PROT SERPL-MCNC: 8 G/DL
SODIUM SERPL-SCNC: 139 MMOL/L

## 2024-09-23 ENCOUNTER — APPOINTMENT (OUTPATIENT)
Dept: INFUSION THERAPY | Facility: HOSPITAL | Age: 54
End: 2024-09-23

## 2024-09-25 ENCOUNTER — APPOINTMENT (OUTPATIENT)
Dept: HEMATOLOGY ONCOLOGY | Facility: CLINIC | Age: 54
End: 2024-09-25

## 2024-10-10 ENCOUNTER — RESULT REVIEW (OUTPATIENT)
Age: 54
End: 2024-10-10

## 2024-10-10 ENCOUNTER — APPOINTMENT (OUTPATIENT)
Dept: HEMATOLOGY ONCOLOGY | Facility: CLINIC | Age: 54
End: 2024-10-10
Payer: COMMERCIAL

## 2024-10-10 VITALS
RESPIRATION RATE: 16 BRPM | HEART RATE: 81 BPM | OXYGEN SATURATION: 99 % | DIASTOLIC BLOOD PRESSURE: 80 MMHG | WEIGHT: 181.44 LBS | SYSTOLIC BLOOD PRESSURE: 123 MMHG | BODY MASS INDEX: 27.59 KG/M2 | TEMPERATURE: 97 F

## 2024-10-10 DIAGNOSIS — C18.1 MALIGNANT NEOPLASM OF APPENDIX: ICD-10-CM

## 2024-10-10 LAB
BASOPHILS # BLD AUTO: 0.02 K/UL — SIGNIFICANT CHANGE UP (ref 0–0.2)
BASOPHILS NFR BLD AUTO: 0.3 % — SIGNIFICANT CHANGE UP (ref 0–2)
EOSINOPHIL # BLD AUTO: 0.32 K/UL — SIGNIFICANT CHANGE UP (ref 0–0.5)
EOSINOPHIL NFR BLD AUTO: 5.5 % — SIGNIFICANT CHANGE UP (ref 0–6)
HCT VFR BLD CALC: 33.8 % — LOW (ref 39–50)
HGB BLD-MCNC: 10.8 G/DL — LOW (ref 13–17)
IMM GRANULOCYTES NFR BLD AUTO: 0.2 % — SIGNIFICANT CHANGE UP (ref 0–0.9)
LYMPHOCYTES # BLD AUTO: 2.29 K/UL — SIGNIFICANT CHANGE UP (ref 1–3.3)
LYMPHOCYTES # BLD AUTO: 39.4 % — SIGNIFICANT CHANGE UP (ref 13–44)
MCHC RBC-ENTMCNC: 23.3 PG — LOW (ref 27–34)
MCHC RBC-ENTMCNC: 32 G/DL — SIGNIFICANT CHANGE UP (ref 32–36)
MCV RBC AUTO: 72.8 FL — LOW (ref 80–100)
MONOCYTES # BLD AUTO: 0.57 K/UL — SIGNIFICANT CHANGE UP (ref 0–0.9)
MONOCYTES NFR BLD AUTO: 9.8 % — SIGNIFICANT CHANGE UP (ref 2–14)
NEUTROPHILS # BLD AUTO: 2.6 K/UL — SIGNIFICANT CHANGE UP (ref 1.8–7.4)
NEUTROPHILS NFR BLD AUTO: 44.8 % — SIGNIFICANT CHANGE UP (ref 43–77)
NRBC # BLD: 0 /100 WBCS — SIGNIFICANT CHANGE UP (ref 0–0)
PLATELET # BLD AUTO: 318 K/UL — SIGNIFICANT CHANGE UP (ref 150–400)
RBC # BLD: 4.64 M/UL — SIGNIFICANT CHANGE UP (ref 4.2–5.8)
RBC # FLD: 18.9 % — HIGH (ref 10.3–14.5)
WBC # BLD: 5.81 K/UL — SIGNIFICANT CHANGE UP (ref 3.8–10.5)
WBC # FLD AUTO: 5.81 K/UL — SIGNIFICANT CHANGE UP (ref 3.8–10.5)

## 2024-10-10 PROCEDURE — 99213 OFFICE O/P EST LOW 20 MIN: CPT

## 2024-10-12 LAB
ALBUMIN SERPL ELPH-MCNC: 4.5 G/DL
ALP BLD-CCNC: 81 U/L
ALT SERPL-CCNC: 18 U/L
ANION GAP SERPL CALC-SCNC: 13 MMOL/L
AST SERPL-CCNC: 32 U/L
BILIRUB SERPL-MCNC: 0.7 MG/DL
BUN SERPL-MCNC: 14 MG/DL
CALCIUM SERPL-MCNC: 7.8 MG/DL
CEA SERPL-MCNC: 1.4 NG/ML
CHLORIDE SERPL-SCNC: 102 MMOL/L
CO2 SERPL-SCNC: 24 MMOL/L
CREAT SERPL-MCNC: 1.06 MG/DL
EGFR: 84 ML/MIN/1.73M2
GLUCOSE SERPL-MCNC: 86 MG/DL
POTASSIUM SERPL-SCNC: 5 MMOL/L
PROT SERPL-MCNC: 8 G/DL
SODIUM SERPL-SCNC: 139 MMOL/L

## 2024-10-14 ENCOUNTER — APPOINTMENT (OUTPATIENT)
Dept: INFUSION THERAPY | Facility: HOSPITAL | Age: 54
End: 2024-10-14

## 2024-10-30 ENCOUNTER — RESULT REVIEW (OUTPATIENT)
Age: 54
End: 2024-10-30

## 2024-10-30 ENCOUNTER — APPOINTMENT (OUTPATIENT)
Dept: HEMATOLOGY ONCOLOGY | Facility: CLINIC | Age: 54
End: 2024-10-30
Payer: COMMERCIAL

## 2024-10-30 VITALS
DIASTOLIC BLOOD PRESSURE: 74 MMHG | TEMPERATURE: 98 F | WEIGHT: 180.78 LBS | RESPIRATION RATE: 16 BRPM | HEART RATE: 77 BPM | BODY MASS INDEX: 27.49 KG/M2 | SYSTOLIC BLOOD PRESSURE: 110 MMHG | OXYGEN SATURATION: 97 %

## 2024-10-30 DIAGNOSIS — C18.1 MALIGNANT NEOPLASM OF APPENDIX: ICD-10-CM

## 2024-10-30 LAB
ALBUMIN SERPL ELPH-MCNC: 4.6 G/DL
ALP BLD-CCNC: 71 U/L
ALT SERPL-CCNC: 16 U/L
ANION GAP SERPL CALC-SCNC: 14 MMOL/L
AST SERPL-CCNC: 43 U/L
BASOPHILS # BLD AUTO: 0.02 K/UL — SIGNIFICANT CHANGE UP (ref 0–0.2)
BASOPHILS NFR BLD AUTO: 0.4 % — SIGNIFICANT CHANGE UP (ref 0–2)
BILIRUB SERPL-MCNC: 0.7 MG/DL
BUN SERPL-MCNC: 17 MG/DL
CALCIUM SERPL-MCNC: 9.7 MG/DL
CHLORIDE SERPL-SCNC: 102 MMOL/L
CO2 SERPL-SCNC: 23 MMOL/L
CREAT SERPL-MCNC: 1.07 MG/DL
EGFR: 84 ML/MIN/1.73M2
EOSINOPHIL # BLD AUTO: 0.27 K/UL — SIGNIFICANT CHANGE UP (ref 0–0.5)
EOSINOPHIL NFR BLD AUTO: 5.1 % — SIGNIFICANT CHANGE UP (ref 0–6)
GLUCOSE SERPL-MCNC: 104 MG/DL
HCT VFR BLD CALC: 34 % — LOW (ref 39–50)
HGB BLD-MCNC: 10.9 G/DL — LOW (ref 13–17)
IMM GRANULOCYTES NFR BLD AUTO: 0.2 % — SIGNIFICANT CHANGE UP (ref 0–0.9)
LYMPHOCYTES # BLD AUTO: 1.98 K/UL — SIGNIFICANT CHANGE UP (ref 1–3.3)
LYMPHOCYTES # BLD AUTO: 37.6 % — SIGNIFICANT CHANGE UP (ref 13–44)
MCHC RBC-ENTMCNC: 23.3 PG — LOW (ref 27–34)
MCHC RBC-ENTMCNC: 32.1 G/DL — SIGNIFICANT CHANGE UP (ref 32–36)
MCV RBC AUTO: 72.8 FL — LOW (ref 80–100)
MONOCYTES # BLD AUTO: 0.55 K/UL — SIGNIFICANT CHANGE UP (ref 0–0.9)
MONOCYTES NFR BLD AUTO: 10.5 % — SIGNIFICANT CHANGE UP (ref 2–14)
NEUTROPHILS # BLD AUTO: 2.43 K/UL — SIGNIFICANT CHANGE UP (ref 1.8–7.4)
NEUTROPHILS NFR BLD AUTO: 46.2 % — SIGNIFICANT CHANGE UP (ref 43–77)
NRBC # BLD: 0 /100 WBCS — SIGNIFICANT CHANGE UP (ref 0–0)
PLATELET # BLD AUTO: 313 K/UL — SIGNIFICANT CHANGE UP (ref 150–400)
POTASSIUM SERPL-SCNC: 4.9 MMOL/L
PROT SERPL-MCNC: 8 G/DL
RBC # BLD: 4.67 M/UL — SIGNIFICANT CHANGE UP (ref 4.2–5.8)
RBC # FLD: 17.3 % — HIGH (ref 10.3–14.5)
SODIUM SERPL-SCNC: 138 MMOL/L
WBC # BLD: 5.26 K/UL — SIGNIFICANT CHANGE UP (ref 3.8–10.5)
WBC # FLD AUTO: 5.26 K/UL — SIGNIFICANT CHANGE UP (ref 3.8–10.5)

## 2024-10-30 PROCEDURE — 99213 OFFICE O/P EST LOW 20 MIN: CPT

## 2024-11-18 ENCOUNTER — APPOINTMENT (OUTPATIENT)
Dept: CT IMAGING | Facility: IMAGING CENTER | Age: 54
End: 2024-11-18
Payer: COMMERCIAL

## 2024-11-18 ENCOUNTER — OUTPATIENT (OUTPATIENT)
Dept: OUTPATIENT SERVICES | Facility: HOSPITAL | Age: 54
LOS: 1 days | End: 2024-11-18
Payer: COMMERCIAL

## 2024-11-18 DIAGNOSIS — C18.1 MALIGNANT NEOPLASM OF APPENDIX: ICD-10-CM

## 2024-11-18 DIAGNOSIS — Z98.890 OTHER SPECIFIED POSTPROCEDURAL STATES: Chronic | ICD-10-CM

## 2024-11-18 PROCEDURE — 71260 CT THORAX DX C+: CPT | Mod: 26

## 2024-11-18 PROCEDURE — 74177 CT ABD & PELVIS W/CONTRAST: CPT | Mod: 26

## 2024-11-18 PROCEDURE — 74177 CT ABD & PELVIS W/CONTRAST: CPT

## 2024-11-18 PROCEDURE — 71260 CT THORAX DX C+: CPT

## 2024-11-22 ENCOUNTER — OUTPATIENT (OUTPATIENT)
Dept: OUTPATIENT SERVICES | Facility: HOSPITAL | Age: 54
LOS: 1 days | Discharge: HOME | End: 2024-11-22

## 2024-11-22 DIAGNOSIS — Z98.890 OTHER SPECIFIED POSTPROCEDURAL STATES: Chronic | ICD-10-CM

## 2024-11-22 DIAGNOSIS — C18.9 MALIGNANT NEOPLASM OF COLON, UNSPECIFIED: ICD-10-CM

## 2024-11-27 ENCOUNTER — APPOINTMENT (OUTPATIENT)
Dept: HEMATOLOGY ONCOLOGY | Facility: CLINIC | Age: 54
End: 2024-11-27

## 2024-11-27 ENCOUNTER — RESULT REVIEW (OUTPATIENT)
Age: 54
End: 2024-11-27

## 2024-11-27 VITALS
HEART RATE: 79 BPM | RESPIRATION RATE: 17 BRPM | SYSTOLIC BLOOD PRESSURE: 119 MMHG | DIASTOLIC BLOOD PRESSURE: 75 MMHG | TEMPERATURE: 97.7 F | OXYGEN SATURATION: 95 % | WEIGHT: 180.32 LBS | BODY MASS INDEX: 27.42 KG/M2

## 2024-11-27 DIAGNOSIS — C18.1 MALIGNANT NEOPLASM OF APPENDIX: ICD-10-CM

## 2024-11-27 LAB
ALBUMIN SERPL ELPH-MCNC: 4.7 G/DL
ALP BLD-CCNC: 74 U/L
ALT SERPL-CCNC: 15 U/L
ANION GAP SERPL CALC-SCNC: 10 MMOL/L
AST SERPL-CCNC: 32 U/L
BASOPHILS # BLD AUTO: 0.02 K/UL — SIGNIFICANT CHANGE UP (ref 0–0.2)
BASOPHILS NFR BLD AUTO: 0.4 % — SIGNIFICANT CHANGE UP (ref 0–2)
BILIRUB SERPL-MCNC: 0.9 MG/DL
BUN SERPL-MCNC: 16 MG/DL
CALCIUM SERPL-MCNC: 9.7 MG/DL
CEA SERPL-MCNC: 1.6 NG/ML
CHLORIDE SERPL-SCNC: 103 MMOL/L
CO2 SERPL-SCNC: 24 MMOL/L
CREAT SERPL-MCNC: 1.14 MG/DL
EGFR: 77 ML/MIN/1.73M2
EOSINOPHIL # BLD AUTO: 0.26 K/UL — SIGNIFICANT CHANGE UP (ref 0–0.5)
EOSINOPHIL NFR BLD AUTO: 4.8 % — SIGNIFICANT CHANGE UP (ref 0–6)
GLUCOSE SERPL-MCNC: 97 MG/DL
HCT VFR BLD CALC: 35.8 % — LOW (ref 39–50)
HGB BLD-MCNC: 11.3 G/DL — LOW (ref 13–17)
IMM GRANULOCYTES NFR BLD AUTO: 0.2 % — SIGNIFICANT CHANGE UP (ref 0–0.9)
LYMPHOCYTES # BLD AUTO: 2.23 K/UL — SIGNIFICANT CHANGE UP (ref 1–3.3)
LYMPHOCYTES # BLD AUTO: 40.9 % — SIGNIFICANT CHANGE UP (ref 13–44)
MCHC RBC-ENTMCNC: 22.7 PG — LOW (ref 27–34)
MCHC RBC-ENTMCNC: 31.6 G/DL — LOW (ref 32–36)
MCV RBC AUTO: 72 FL — LOW (ref 80–100)
MONOCYTES # BLD AUTO: 0.61 K/UL — SIGNIFICANT CHANGE UP (ref 0–0.9)
MONOCYTES NFR BLD AUTO: 11.2 % — SIGNIFICANT CHANGE UP (ref 2–14)
NEUTROPHILS # BLD AUTO: 2.32 K/UL — SIGNIFICANT CHANGE UP (ref 1.8–7.4)
NEUTROPHILS NFR BLD AUTO: 42.5 % — LOW (ref 43–77)
NRBC # BLD: 0 /100 WBCS — SIGNIFICANT CHANGE UP (ref 0–0)
NRBC BLD-RTO: 0 /100 WBCS — SIGNIFICANT CHANGE UP (ref 0–0)
PLATELET # BLD AUTO: 294 K/UL — SIGNIFICANT CHANGE UP (ref 150–400)
POTASSIUM SERPL-SCNC: 4.9 MMOL/L
PROT SERPL-MCNC: 8.4 G/DL
RBC # BLD: 4.97 M/UL — SIGNIFICANT CHANGE UP (ref 4.2–5.8)
RBC # FLD: 16.3 % — HIGH (ref 10.3–14.5)
SODIUM SERPL-SCNC: 137 MMOL/L
WBC # BLD: 5.45 K/UL — SIGNIFICANT CHANGE UP (ref 3.8–10.5)
WBC # FLD AUTO: 5.45 K/UL — SIGNIFICANT CHANGE UP (ref 3.8–10.5)

## 2024-11-27 PROCEDURE — G2211 COMPLEX E/M VISIT ADD ON: CPT | Mod: NC

## 2024-11-27 PROCEDURE — 99213 OFFICE O/P EST LOW 20 MIN: CPT

## 2025-02-12 ENCOUNTER — OUTPATIENT (OUTPATIENT)
Dept: OUTPATIENT SERVICES | Facility: HOSPITAL | Age: 55
LOS: 1 days | Discharge: HOME | End: 2025-02-12

## 2025-02-12 DIAGNOSIS — Z98.890 OTHER SPECIFIED POSTPROCEDURAL STATES: Chronic | ICD-10-CM

## 2025-02-12 DIAGNOSIS — C18.9 MALIGNANT NEOPLASM OF COLON, UNSPECIFIED: ICD-10-CM

## 2025-02-13 ENCOUNTER — APPOINTMENT (OUTPATIENT)
Dept: HEMATOLOGY ONCOLOGY | Facility: CLINIC | Age: 55
End: 2025-02-13

## 2025-02-13 VITALS
TEMPERATURE: 97.2 F | DIASTOLIC BLOOD PRESSURE: 78 MMHG | RESPIRATION RATE: 16 BRPM | OXYGEN SATURATION: 98 % | HEART RATE: 75 BPM | SYSTOLIC BLOOD PRESSURE: 116 MMHG | WEIGHT: 177.69 LBS | BODY MASS INDEX: 27.02 KG/M2

## 2025-02-13 DIAGNOSIS — C18.1 MALIGNANT NEOPLASM OF APPENDIX: ICD-10-CM

## 2025-02-13 PROCEDURE — G2211 COMPLEX E/M VISIT ADD ON: CPT | Mod: NC

## 2025-02-13 PROCEDURE — 99213 OFFICE O/P EST LOW 20 MIN: CPT

## 2025-02-14 LAB — CEA SERPL-MCNC: 1.8 NG/ML

## 2025-02-26 RX ORDER — SODIUM PICOSULFATE, MAGNESIUM OXIDE, AND ANHYDROUS CITRIC ACID 12; 3.5; 1 G/175ML; G/175ML; MG/175ML
10-3.5-12 MG-GM LIQUID ORAL
Qty: 1 | Refills: 0 | Status: ACTIVE | COMMUNITY
Start: 2025-02-26 | End: 1900-01-01

## 2025-03-10 ENCOUNTER — APPOINTMENT (OUTPATIENT)
Dept: SURGERY | Facility: HOSPITAL | Age: 55
End: 2025-03-10
Payer: COMMERCIAL

## 2025-03-10 ENCOUNTER — TRANSCRIPTION ENCOUNTER (OUTPATIENT)
Age: 55
End: 2025-03-10

## 2025-03-10 ENCOUNTER — OUTPATIENT (OUTPATIENT)
Dept: OUTPATIENT SERVICES | Facility: HOSPITAL | Age: 55
LOS: 1 days | End: 2025-03-10
Payer: COMMERCIAL

## 2025-03-10 VITALS
SYSTOLIC BLOOD PRESSURE: 123 MMHG | TEMPERATURE: 97 F | OXYGEN SATURATION: 100 % | HEIGHT: 68 IN | HEART RATE: 80 BPM | RESPIRATION RATE: 21 BRPM | WEIGHT: 175.05 LBS | DIASTOLIC BLOOD PRESSURE: 78 MMHG

## 2025-03-10 VITALS
SYSTOLIC BLOOD PRESSURE: 104 MMHG | RESPIRATION RATE: 17 BRPM | OXYGEN SATURATION: 99 % | HEART RATE: 66 BPM | DIASTOLIC BLOOD PRESSURE: 69 MMHG

## 2025-03-10 DIAGNOSIS — C18.1 MALIGNANT NEOPLASM OF APPENDIX: ICD-10-CM

## 2025-03-10 DIAGNOSIS — Z98.890 OTHER SPECIFIED POSTPROCEDURAL STATES: Chronic | ICD-10-CM

## 2025-03-10 PROCEDURE — G0105: CPT

## 2025-03-10 PROCEDURE — 45378 DIAGNOSTIC COLONOSCOPY: CPT

## 2025-03-10 DEVICE — NET RETRV ROT ROTH 2.5MMX230CM: Type: IMPLANTABLE DEVICE | Status: FUNCTIONAL

## 2025-03-10 RX ADMIN — Medication 30 MILLILITER(S): at 10:16

## 2025-03-10 NOTE — PRE PROCEDURE NOTE - PRE PROCEDURE EVALUATION
Attending Physician: fernando                           Procedure:colo    Indication for Procedure:  ________________________________surveilance________________________  PAST MEDICAL & SURGICAL HISTORY:  Anemia, Fanconi      Appendiceal tumor      Status post colonoscopy        ALLERGIES:  No Known Allergies    HOME MEDICATIONS:  Allegra 180 mg oral tablet: 1 tab(s) orally once a day as needed for  allergy symptoms  Multiple Vitamins oral tablet: 1 tab(s) orally once a day    AICD/PPM: [ ] yes   [ ] no    PERTINENT LAB DATA:                      PHYSICAL EXAMINATION:    Height (cm): 172.7  Weight (kg): 79.4  BMI (kg/m2): 26.6  BSA (m2): 1.93T(C): 36.3  HR: 80  BP: 123/78  RR: 21  SpO2: 100%    Constitutional: NAD  HEENT: PERRLA, EOMI,    Neck:  No JVD  Respiratory: CTAB/L  Cardiovascular: S1 and S2  Gastrointestinal: BS+, soft, NT/ND  Extremities: No peripheral edema  Neurological: A/O x 3, no focal deficits  Psychiatric: Normal mood, normal affect  Skin: No rashes    ASA Class: I [ ]  II [ ]  III [ ]  IV [ ]    COMMENTS:    The patient is a suitable candidate for the planned procedure unless box checked [ ]  No, explain:

## 2025-03-10 NOTE — ASU DISCHARGE PLAN (ADULT/PEDIATRIC) - FINANCIAL ASSISTANCE
[facility] provides services at a reduced cost to those who are determined to be eligible through [facility]’s financial assistance program. Information regarding [facility]’s financial assistance program can be found by going to [email or web address] or by calling [phone].

## 2025-05-04 ENCOUNTER — OUTPATIENT (OUTPATIENT)
Dept: OUTPATIENT SERVICES | Facility: HOSPITAL | Age: 55
LOS: 1 days | Discharge: HOME | End: 2025-05-04

## 2025-05-04 DIAGNOSIS — Z98.890 OTHER SPECIFIED POSTPROCEDURAL STATES: Chronic | ICD-10-CM

## 2025-05-04 DIAGNOSIS — C18.9 MALIGNANT NEOPLASM OF COLON, UNSPECIFIED: ICD-10-CM

## 2025-05-05 ENCOUNTER — APPOINTMENT (OUTPATIENT)
Dept: CT IMAGING | Facility: IMAGING CENTER | Age: 55
End: 2025-05-05
Payer: COMMERCIAL

## 2025-05-05 ENCOUNTER — OUTPATIENT (OUTPATIENT)
Dept: OUTPATIENT SERVICES | Facility: HOSPITAL | Age: 55
LOS: 1 days | End: 2025-05-05
Payer: COMMERCIAL

## 2025-05-05 DIAGNOSIS — C18.1 MALIGNANT NEOPLASM OF APPENDIX: ICD-10-CM

## 2025-05-05 DIAGNOSIS — Z98.890 OTHER SPECIFIED POSTPROCEDURAL STATES: Chronic | ICD-10-CM

## 2025-05-05 PROCEDURE — 71260 CT THORAX DX C+: CPT | Mod: 26

## 2025-05-05 PROCEDURE — 74177 CT ABD & PELVIS W/CONTRAST: CPT | Mod: 26

## 2025-05-05 PROCEDURE — 71260 CT THORAX DX C+: CPT

## 2025-05-05 PROCEDURE — 74177 CT ABD & PELVIS W/CONTRAST: CPT

## 2025-05-06 ENCOUNTER — NON-APPOINTMENT (OUTPATIENT)
Age: 55
End: 2025-05-06

## 2025-05-08 ENCOUNTER — RESULT REVIEW (OUTPATIENT)
Age: 55
End: 2025-05-08

## 2025-05-08 ENCOUNTER — APPOINTMENT (OUTPATIENT)
Dept: HEMATOLOGY ONCOLOGY | Facility: CLINIC | Age: 55
End: 2025-05-08

## 2025-05-08 VITALS
WEIGHT: 175.93 LBS | SYSTOLIC BLOOD PRESSURE: 116 MMHG | DIASTOLIC BLOOD PRESSURE: 77 MMHG | RESPIRATION RATE: 16 BRPM | OXYGEN SATURATION: 97 % | TEMPERATURE: 98 F | BODY MASS INDEX: 26.75 KG/M2 | HEART RATE: 80 BPM

## 2025-05-08 VITALS
RESPIRATION RATE: 15 BRPM | TEMPERATURE: 97.7 F | OXYGEN SATURATION: 97 % | SYSTOLIC BLOOD PRESSURE: 151 MMHG | BODY MASS INDEX: 26.73 KG/M2 | WEIGHT: 176.37 LBS | HEIGHT: 68.11 IN | DIASTOLIC BLOOD PRESSURE: 82 MMHG | HEART RATE: 65 BPM

## 2025-05-08 DIAGNOSIS — C18.1 MALIGNANT NEOPLASM OF APPENDIX: ICD-10-CM

## 2025-05-08 LAB
BASOPHILS # BLD AUTO: 0.03 K/UL — SIGNIFICANT CHANGE UP (ref 0–0.2)
BASOPHILS NFR BLD AUTO: 0.4 % — SIGNIFICANT CHANGE UP (ref 0–2)
EOSINOPHIL # BLD AUTO: 0.12 K/UL — SIGNIFICANT CHANGE UP (ref 0–0.5)
EOSINOPHIL NFR BLD AUTO: 1.6 % — SIGNIFICANT CHANGE UP (ref 0–6)
HCT VFR BLD CALC: 37.5 % — LOW (ref 39–50)
HGB BLD-MCNC: 11.9 G/DL — LOW (ref 13–17)
IMM GRANULOCYTES NFR BLD AUTO: 0.5 % — SIGNIFICANT CHANGE UP (ref 0–0.9)
LYMPHOCYTES # BLD AUTO: 1.75 K/UL — SIGNIFICANT CHANGE UP (ref 1–3.3)
LYMPHOCYTES # BLD AUTO: 23.4 % — SIGNIFICANT CHANGE UP (ref 13–44)
MCHC RBC-ENTMCNC: 19.9 PG — LOW (ref 27–34)
MCHC RBC-ENTMCNC: 31.7 G/DL — LOW (ref 32–36)
MCV RBC AUTO: 62.7 FL — LOW (ref 80–100)
MONOCYTES # BLD AUTO: 0.54 K/UL — SIGNIFICANT CHANGE UP (ref 0–0.9)
MONOCYTES NFR BLD AUTO: 7.2 % — SIGNIFICANT CHANGE UP (ref 2–14)
NEUTROPHILS # BLD AUTO: 5 K/UL — SIGNIFICANT CHANGE UP (ref 1.8–7.4)
NEUTROPHILS NFR BLD AUTO: 66.9 % — SIGNIFICANT CHANGE UP (ref 43–77)
NRBC BLD AUTO-RTO: 0 /100 WBCS — SIGNIFICANT CHANGE UP (ref 0–0)
PLATELET # BLD AUTO: 310 K/UL — SIGNIFICANT CHANGE UP (ref 150–400)
RBC # BLD: 5.98 M/UL — HIGH (ref 4.2–5.8)
RBC # FLD: 16.1 % — HIGH (ref 10.3–14.5)
WBC # BLD: 7.48 K/UL — SIGNIFICANT CHANGE UP (ref 3.8–10.5)
WBC # FLD AUTO: 7.48 K/UL — SIGNIFICANT CHANGE UP (ref 3.8–10.5)

## 2025-05-08 PROCEDURE — 99213 OFFICE O/P EST LOW 20 MIN: CPT

## 2025-05-08 PROCEDURE — G2211 COMPLEX E/M VISIT ADD ON: CPT | Mod: NC

## 2025-05-09 LAB
ALBUMIN SERPL ELPH-MCNC: 4.7 G/DL
ALP BLD-CCNC: 84 U/L
ALT SERPL-CCNC: 35 U/L
ANION GAP SERPL CALC-SCNC: 14 MMOL/L
AST SERPL-CCNC: 33 U/L
BILIRUB SERPL-MCNC: 0.6 MG/DL
BUN SERPL-MCNC: 18 MG/DL
CALCIUM SERPL-MCNC: 9.4 MG/DL
CEA SERPL-MCNC: 1.5 NG/ML
CHLORIDE SERPL-SCNC: 102 MMOL/L
CO2 SERPL-SCNC: 22 MMOL/L
CREAT SERPL-MCNC: 1 MG/DL
EGFRCR SERPLBLD CKD-EPI 2021: 91 ML/MIN/1.73M2
GLUCOSE SERPL-MCNC: 96 MG/DL
POTASSIUM SERPL-SCNC: 4.5 MMOL/L
PROT SERPL-MCNC: 8.3 G/DL
SODIUM SERPL-SCNC: 139 MMOL/L

## 2025-08-11 ENCOUNTER — APPOINTMENT (OUTPATIENT)
Dept: HEMATOLOGY ONCOLOGY | Facility: CLINIC | Age: 55
End: 2025-08-11

## 2025-08-11 VITALS
DIASTOLIC BLOOD PRESSURE: 77 MMHG | SYSTOLIC BLOOD PRESSURE: 118 MMHG | WEIGHT: 176.37 LBS | RESPIRATION RATE: 14 BRPM | HEART RATE: 64 BPM | TEMPERATURE: 97.2 F | OXYGEN SATURATION: 97 % | BODY MASS INDEX: 26.82 KG/M2

## 2025-08-11 DIAGNOSIS — C18.1 MALIGNANT NEOPLASM OF APPENDIX: ICD-10-CM

## 2025-08-11 PROCEDURE — 99213 OFFICE O/P EST LOW 20 MIN: CPT

## 2025-08-11 PROCEDURE — G2211 COMPLEX E/M VISIT ADD ON: CPT | Mod: NC

## 2025-08-12 LAB — CEA SERPL-MCNC: 1.1 NG/ML

## (undated) DEVICE — POSITIONER FOAM EGG CRATE ULNAR 2PCS (PINK)

## (undated) DEVICE — CATH IV SAFE BC 22G X 1" (BLUE)

## (undated) DEVICE — POLY TRAP ETRAP

## (undated) DEVICE — Device

## (undated) DEVICE — BLADE SCALPEL SAFETYLOCK #15

## (undated) DEVICE — ELCTR CORD FOOTSWITCH 1PLR LAPSCP 10FT

## (undated) DEVICE — SUCTION YANKAUER NO CONTROL VENT

## (undated) DEVICE — VENODYNE/SCD SLEEVE CALF MEDIUM

## (undated) DEVICE — TROCAR APPLIED MEDICAL KII BALLOON BLUNT TIP 12MM X 100MM

## (undated) DEVICE — IRRIGATOR BIO SHIELD

## (undated) DEVICE — FOLEY TRAY 16FR 5CC LTX UMETER CLOSED

## (undated) DEVICE — GLV 8 PROTEXIS (WHITE)

## (undated) DEVICE — BRUSH COLONOSCOPY CYTOLOGY

## (undated) DEVICE — SYR LUER LOK 50CC

## (undated) DEVICE — SUT POLYSORB 3-0 30" V-20 UNDYED

## (undated) DEVICE — FORCEP RADIAL JAW 4 JUMBO 2.8MM 3.2MM 240CM ORANGE DISP

## (undated) DEVICE — GLV 8.5 PROTEXIS (WHITE)

## (undated) DEVICE — DRSG OPSITE 13.75 X 4"

## (undated) DEVICE — LIGASURE IMPACT

## (undated) DEVICE — PACK MAJOR ABDOMINAL SUPINE

## (undated) DEVICE — TROCAR COVIDIEN VERSASTEP PLUS 12MM STANDARD

## (undated) DEVICE — PACK IV START WITH CHG

## (undated) DEVICE — GOWN TRIMAX LG

## (undated) DEVICE — DRAPE TOWEL BLUE 17" X 24"

## (undated) DEVICE — TUBING STRYKEFLOW II SUCTION / IRRIGATOR

## (undated) DEVICE — DRSG STERISTRIPS 0.5 X 4"

## (undated) DEVICE — APPLICATOR VISTASEAL LAP DUAL 35CM RIGID

## (undated) DEVICE — TUBING IV SET GRAVITY 3Y 100" MACRO

## (undated) DEVICE — SUT SOFSILK 3-0 18" V-20 (POP-OFF)

## (undated) DEVICE — BIOPSY FORCEP RADIAL JAW 4 STANDARD WITH NEEDLE

## (undated) DEVICE — DRAPE 1/2 SHEET 40X57"

## (undated) DEVICE — STAPLER COVIDIEN ENDO GIA STANDARD HANDLE

## (undated) DEVICE — SUT POLYSORB 0 18" MP UNDYED

## (undated) DEVICE — WARMING BLANKET UPPER ADULT

## (undated) DEVICE — TUBING SUCTION CONN 6FT STERILE

## (undated) DEVICE — DRSG TAPE UMBILICAL COTTON 2" X 30 X 1/8"

## (undated) DEVICE — SHEARS COVIDIEN ENDO SHEAR 5MM X 31CM W UNIPOLAR CAUTERY

## (undated) DEVICE — TUBING INSUFFLATION LAP FILTER 10FT

## (undated) DEVICE — ELCTR BOVIE PENCIL SMOKE EVACUATION

## (undated) DEVICE — TUBING SUCTION 20FT

## (undated) DEVICE — CLAMP BX HOT RAD JAW 3

## (undated) DEVICE — PACK COLON BUNDLE

## (undated) DEVICE — LIGASURE MARYLAND 37CM

## (undated) DEVICE — TROCAR COVIDIEN VERSAPORT BLADELESS OPTICAL 5MM STANDARD

## (undated) DEVICE — FOLEY HOLDER STATLOCK 2 WAY ADULT

## (undated) DEVICE — SPECIMEN CONTAINER 100ML

## (undated) DEVICE — SUT MAXON 1 36" GS-24

## (undated) DEVICE — DRSG TEGADERM 6"X8"

## (undated) DEVICE — SOL IRR POUR NS 0.9% 500ML

## (undated) DEVICE — SOL INJ NS 0.9% 500ML 2 PORT

## (undated) DEVICE — CATH IV SAFE BC 20G X 1.16" (PINK)

## (undated) DEVICE — DRAIN PENROSE .25" X 18" LATEX

## (undated) DEVICE — GLV 7 PROTEXIS (WHITE)

## (undated) DEVICE — SUT POLYSORB 1 60" TIES

## (undated) DEVICE — DRAPE IOBAN 23" X 23"

## (undated) DEVICE — DRAPE GENERAL ENDOSCOPY

## (undated) DEVICE — ELCTR GROUNDING PAD ADULT COVIDIEN

## (undated) DEVICE — SENSOR O2 FINGER ADULT

## (undated) DEVICE — GLV 6.5 PROTEXIS (WHITE)

## (undated) DEVICE — PREP CHLORAPREP HI-LITE ORANGE 26ML

## (undated) DEVICE — GLV 7.5 PROTEXIS (WHITE)

## (undated) DEVICE — D HELP - CLEARVIEW CLEARIFY SYSTEM

## (undated) DEVICE — STAPLER SKIN VISI-STAT 35 WIDE

## (undated) DEVICE — SOL IRR POUR H2O 250ML